# Patient Record
Sex: MALE | URBAN - METROPOLITAN AREA
[De-identification: names, ages, dates, MRNs, and addresses within clinical notes are randomized per-mention and may not be internally consistent; named-entity substitution may affect disease eponyms.]

---

## 2023-06-29 ENCOUNTER — INPATIENT (INPATIENT)
Facility: HOSPITAL | Age: 28
LOS: 13 days | Discharge: HOME | End: 2023-07-13
Attending: PSYCHIATRY & NEUROLOGY | Admitting: PSYCHIATRY & NEUROLOGY
Payer: SELF-PAY

## 2023-06-29 VITALS
DIASTOLIC BLOOD PRESSURE: 82 MMHG | TEMPERATURE: 97 F | RESPIRATION RATE: 16 BRPM | SYSTOLIC BLOOD PRESSURE: 106 MMHG | OXYGEN SATURATION: 99 %

## 2023-06-29 DIAGNOSIS — R46.89 OTHER SYMPTOMS AND SIGNS INVOLVING APPEARANCE AND BEHAVIOR: ICD-10-CM

## 2023-06-29 DIAGNOSIS — F29 UNSPECIFIED PSYCHOSIS NOT DUE TO A SUBSTANCE OR KNOWN PHYSIOLOGICAL CONDITION: ICD-10-CM

## 2023-06-29 LAB
ANION GAP SERPL CALC-SCNC: 9 MMOL/L — SIGNIFICANT CHANGE UP (ref 7–14)
BASOPHILS # BLD AUTO: 0.04 K/UL — SIGNIFICANT CHANGE UP (ref 0–0.2)
BASOPHILS NFR BLD AUTO: 0.8 % — SIGNIFICANT CHANGE UP (ref 0–2)
BUN SERPL-MCNC: 11 MG/DL — SIGNIFICANT CHANGE UP (ref 7–23)
CALCIUM SERPL-MCNC: 9.3 MG/DL — SIGNIFICANT CHANGE UP (ref 8.4–10.5)
CHLORIDE SERPL-SCNC: 104 MMOL/L — SIGNIFICANT CHANGE UP (ref 98–107)
CO2 SERPL-SCNC: 24 MMOL/L — SIGNIFICANT CHANGE UP (ref 22–31)
CREAT SERPL-MCNC: 0.95 MG/DL — SIGNIFICANT CHANGE UP (ref 0.5–1.3)
EGFR: 110 ML/MIN/1.73M2 — SIGNIFICANT CHANGE UP
EOSINOPHIL # BLD AUTO: 0.35 K/UL — SIGNIFICANT CHANGE UP (ref 0–0.5)
EOSINOPHIL NFR BLD AUTO: 7.4 % — HIGH (ref 0–6)
GLUCOSE SERPL-MCNC: 84 MG/DL — SIGNIFICANT CHANGE UP (ref 70–99)
HCT VFR BLD CALC: 37.2 % — LOW (ref 39–50)
HGB BLD-MCNC: 12 G/DL — LOW (ref 13–17)
IANC: 1.96 K/UL — SIGNIFICANT CHANGE UP (ref 1.8–7.4)
IMM GRANULOCYTES NFR BLD AUTO: 0.2 % — SIGNIFICANT CHANGE UP (ref 0–0.9)
LITHIUM SERPL-MCNC: <0.1 MMOL/L — LOW (ref 0.6–1.2)
LYMPHOCYTES # BLD AUTO: 2.09 K/UL — SIGNIFICANT CHANGE UP (ref 1–3.3)
LYMPHOCYTES # BLD AUTO: 44.4 % — HIGH (ref 13–44)
MCHC RBC-ENTMCNC: 29.3 PG — SIGNIFICANT CHANGE UP (ref 27–34)
MCHC RBC-ENTMCNC: 32.3 GM/DL — SIGNIFICANT CHANGE UP (ref 32–36)
MCV RBC AUTO: 90.7 FL — SIGNIFICANT CHANGE UP (ref 80–100)
MONOCYTES # BLD AUTO: 0.26 K/UL — SIGNIFICANT CHANGE UP (ref 0–0.9)
MONOCYTES NFR BLD AUTO: 5.5 % — SIGNIFICANT CHANGE UP (ref 2–14)
NEUTROPHILS # BLD AUTO: 1.96 K/UL — SIGNIFICANT CHANGE UP (ref 1.8–7.4)
NEUTROPHILS NFR BLD AUTO: 41.7 % — LOW (ref 43–77)
NRBC # BLD: 0 /100 WBCS — SIGNIFICANT CHANGE UP (ref 0–0)
NRBC # FLD: 0 K/UL — SIGNIFICANT CHANGE UP (ref 0–0)
PLATELET # BLD AUTO: 239 K/UL — SIGNIFICANT CHANGE UP (ref 150–400)
POTASSIUM SERPL-MCNC: 3.7 MMOL/L — SIGNIFICANT CHANGE UP (ref 3.5–5.3)
POTASSIUM SERPL-SCNC: 3.7 MMOL/L — SIGNIFICANT CHANGE UP (ref 3.5–5.3)
RBC # BLD: 4.1 M/UL — LOW (ref 4.2–5.8)
RBC # FLD: 14.5 % — SIGNIFICANT CHANGE UP (ref 10.3–14.5)
SARS-COV-2 RNA SPEC QL NAA+PROBE: SIGNIFICANT CHANGE UP
SODIUM SERPL-SCNC: 137 MMOL/L — SIGNIFICANT CHANGE UP (ref 135–145)
TSH SERPL-MCNC: 0.89 UIU/ML — SIGNIFICANT CHANGE UP (ref 0.27–4.2)
VALPROATE SERPL-MCNC: <3.15 UG/ML — LOW (ref 50–100)
WBC # BLD: 4.71 K/UL — SIGNIFICANT CHANGE UP (ref 3.8–10.5)
WBC # FLD AUTO: 4.71 K/UL — SIGNIFICANT CHANGE UP (ref 3.8–10.5)

## 2023-06-29 PROCEDURE — G1004: CPT

## 2023-06-29 PROCEDURE — 71045 X-RAY EXAM CHEST 1 VIEW: CPT | Mod: 26

## 2023-06-29 PROCEDURE — 99285 EMERGENCY DEPT VISIT HI MDM: CPT

## 2023-06-29 PROCEDURE — 70450 CT HEAD/BRAIN W/O DYE: CPT | Mod: 26,MG

## 2023-06-29 RX ORDER — DIPHENHYDRAMINE HCL 50 MG
50 CAPSULE ORAL ONCE
Refills: 0 | Status: DISCONTINUED | OUTPATIENT
Start: 2023-06-30 | End: 2023-07-13

## 2023-06-29 RX ORDER — HYDROXYZINE HCL 10 MG
50 TABLET ORAL EVERY 6 HOURS
Refills: 0 | Status: DISCONTINUED | OUTPATIENT
Start: 2023-06-30 | End: 2023-07-13

## 2023-06-29 RX ORDER — RISPERIDONE 4 MG/1
1 TABLET ORAL AT BEDTIME
Refills: 0 | Status: DISCONTINUED | OUTPATIENT
Start: 2023-06-30 | End: 2023-07-02

## 2023-06-29 RX ORDER — SODIUM CHLORIDE 9 MG/ML
3 INJECTION INTRAMUSCULAR; INTRAVENOUS; SUBCUTANEOUS ONCE
Refills: 0 | Status: COMPLETED | OUTPATIENT
Start: 2023-06-29 | End: 2023-06-29

## 2023-06-29 RX ORDER — HALOPERIDOL DECANOATE 100 MG/ML
5 INJECTION INTRAMUSCULAR ONCE
Refills: 0 | Status: COMPLETED | OUTPATIENT
Start: 2023-06-29 | End: 2023-06-29

## 2023-06-29 RX ORDER — DIPHENHYDRAMINE HCL 50 MG
50 CAPSULE ORAL ONCE
Refills: 0 | Status: COMPLETED | OUTPATIENT
Start: 2023-06-29 | End: 2023-06-29

## 2023-06-29 RX ORDER — HALOPERIDOL DECANOATE 100 MG/ML
5 INJECTION INTRAMUSCULAR ONCE
Refills: 0 | Status: DISCONTINUED | OUTPATIENT
Start: 2023-06-30 | End: 2023-07-13

## 2023-06-29 RX ORDER — HALOPERIDOL DECANOATE 100 MG/ML
5 INJECTION INTRAMUSCULAR EVERY 6 HOURS
Refills: 0 | Status: DISCONTINUED | OUTPATIENT
Start: 2023-06-30 | End: 2023-07-13

## 2023-06-29 RX ADMIN — Medication 50 MILLIGRAM(S): at 11:05

## 2023-06-29 RX ADMIN — HALOPERIDOL DECANOATE 5 MILLIGRAM(S): 100 INJECTION INTRAMUSCULAR at 11:05

## 2023-06-29 RX ADMIN — Medication 2 MILLIGRAM(S): at 11:05

## 2023-06-29 NOTE — ED PROVIDER NOTE - PROGRESS NOTE DETAILS
Consulted by Psych. Labs ordered. Patient stable on stretcher. EKG currently being performed. Psych Dr. Quiroz recommending admission to [facility]

## 2023-06-29 NOTE — ED BEHAVIORAL HEALTH ASSESSMENT NOTE - GENERAL APPEARANCE
does not appear to be actively internally stimulated, young gaunt looking malodorous male - in appropriate dirty casual clothing

## 2023-06-29 NOTE — ED BEHAVIORAL HEALTH ASSESSMENT NOTE - LEGAL HISTORY
no pending legal issues listed under the name of "Palmer Wilfredo" (presuming he is this individual).. otherwise, unknown

## 2023-06-29 NOTE — ED BEHAVIORAL HEALTH NOTE - BEHAVIORAL HEALTH NOTE
COVID Exposure Screen- Patient    Have you been tested for COVID-19 in the last 90 days?  (  ) Yes  (  ) No   ( X ) Unknown- Reason: _____  IF YES: Date of test(s), type of test(s), result(s) for ALL tests in last 90 days: ________    In the past 10 days, have you been around anyone with a positive COVID-19 test? (  ) Yes  (  ) No   (  ) Unknown- Reason: ____  IF YES: Were you closer than 6 feet of them for a total of 15 minutes or more in a 24 hour period? (  ) Yes   (  ) No   ( X ) Unknown- Reason: _____         COVID Exposure Screen- collateral (i.e. third-party, chart review, belongings, etc; include EMS and ED staff)    Has the patient been tested for COVID-19 in the last 90 days?  (  ) Yes   (  ) No   ( X ) Unknown- Reason: _____  IF YES: Date of test(s), type of test(s), result(s) for ALL tests in last 90 days: ________    In the past 10 days, has the patient been around anyone with a positive COVID-19 test? (  ) Yes   (  ) No   (  ) Unknown- Reason: ____  IF YES: Was the patient closer than 6 feet of them for a total of 15 minutes or more in a 24 hour period? (  ) Yes   (  ) No   ( X ) Unknown- Reason: _____

## 2023-06-29 NOTE — ED BEHAVIORAL HEALTH ASSESSMENT NOTE - DETAILS
uncooperative.. refuse to fully engage towards meaningful conversation unknown hx of SA or any SIB discussed with  ED team. no collaterals/ referent obtained at this time discussed with  ED team

## 2023-06-29 NOTE — ED ADULT NURSE NOTE - NSFALLUNIVINTERV_ED_ALL_ED
Bed/Stretcher in lowest position, wheels locked, appropriate side rails in place/Call bell, personal items and telephone in reach/Instruct patient to call for assistance before getting out of bed/chair/stretcher/Non-slip footwear applied when patient is off stretcher/[place] to call system/Physically safe environment - no spills, clutter or unnecessary equipment/Purposeful proactive rounding/Room/bathroom lighting operational, light cord in reach

## 2023-06-29 NOTE — ED BEHAVIORAL HEALTH ASSESSMENT NOTE - DESCRIPTION
uncooperative, refusing to engage with  ED staff.. no reported escalation towards violence whilst at the  ED. does not appear to be inebriated. not delirious. denied any ongoing somatic complaints. no active SI or HI     Vital Signs Last 24 Hrs  T(C): 36.2 (29 Jun 2023 10:27), Max: 36.2 (29 Jun 2023 10:27)  T(F): 97.1 (29 Jun 2023 10:27), Max: 97.1 (29 Jun 2023 10:27)  HR: --  BP: 106/82 (29 Jun 2023 10:27) (106/82 - 106/82)  BP(mean): --  RR: 16 (29 Jun 2023 10:27) (16 - 16)  SpO2: 99% (29 Jun 2023 10:27) (99% - 99%)    Parameters below as of 29 Jun 2023 10:27  Patient On (Oxygen Delivery Method): room air uncooperative, refusing to engage with  ED staff.. no reported escalation towards violence whilst at the  ED. does not appear to be inebriated. not delirious. denied any ongoing somatic complaints. no active SI or HI.. not acutely manic.     Vital Signs Last 24 Hrs  T(C): 36.2 (29 Jun 2023 10:27), Max: 36.2 (29 Jun 2023 10:27)  T(F): 97.1 (29 Jun 2023 10:27), Max: 97.1 (29 Jun 2023 10:27)  HR: --  BP: 106/82 (29 Jun 2023 10:27) (106/82 - 106/82)  BP(mean): --  RR: 16 (29 Jun 2023 10:27) (16 - 16)  SpO2: 99% (29 Jun 2023 10:27) (99% - 99%)    Parameters below as of 29 Jun 2023 10:27  Patient On (Oxygen Delivery Method): room air unknown unknown marital status/ domicility/ employment uncooperative, refusing to engage with  ED staff.. no reported escalation towards violence whilst at the  ED. does not appear to be inebriated. not delirious. denied any ongoing somatic complaints. no active SI or HI.. not acutely manic.     Vital Signs Last 24 Hrs  T(C): 36.2 (29 Jun 2023 10:27), Max: 36.2 (29 Jun 2023 10:27)  T(F): 97.1 (29 Jun 2023 10:27), Max: 97.1 (29 Jun 2023 10:27)  HR: --  BP: 106/82 (29 Jun 2023 10:27) (106/82 - 106/82)  BP(mean): --  RR: 16 (29 Jun 2023 10:27) (16 - 16)  SpO2: 99% (29 Jun 2023 10:27) (99% - 99%)    Parameters below as of 29 Jun 2023 10:27  Patient On (Oxygen Delivery Method): room air    at 2:08PM: attempted to re-evaluate Pt. he is currently sedated.  at 2:10PM, restraints were taken off. no occurrence of agitation reported uncooperative, refusing to engage with  ED staff.. no reported escalation towards violence whilst at the  ED. does not appear to be inebriated. not delirious. denied any ongoing somatic complaints. no active SI or HI.. not acutely manic.     Vital Signs Last 24 Hrs  T(C): 36.2 (29 Jun 2023 10:27), Max: 36.2 (29 Jun 2023 10:27)  T(F): 97.1 (29 Jun 2023 10:27), Max: 97.1 (29 Jun 2023 10:27)  HR: --  BP: 106/82 (29 Jun 2023 10:27) (106/82 - 106/82)  BP(mean): --  RR: 16 (29 Jun 2023 10:27) (16 - 16)  SpO2: 99% (29 Jun 2023 10:27) (99% - 99%)    Parameters below as of 29 Jun 2023 10:27  Patient On (Oxygen Delivery Method): room air    at 2:08PM: attempted to re-evaluate Pt. he is currently sedated.  at 2:10PM, restraints were taken off. no occurrence of agitation reported    at 4:30PM: attempted to engage Pt. he is easily arousable but continues to be guarded, suspicious, uncooperative. he refuses to engage in a meaningful conversation.   - case discussed with  ED team. all labs reviewed along with imaging. medical providers medically clearing Pt.

## 2023-06-29 NOTE — ED BEHAVIORAL HEALTH ASSESSMENT NOTE - HPI (INCLUDE ILLNESS QUALITY, SEVERITY, DURATION, TIMING, CONTEXT, MODIFYING FACTORS, ASSOCIATED SIGNS AND SYMPTOMS)
currently given ID as CALVIN NICHOLAS  on initial encounter with ED attending, he provided a name: "Palmer Villalobos, : 1998"  there is NO yield on CVM or PSYCKES with the above name provided  nothing on the [facility]   Reference #: [id number]  as well as [facility] [address]/ [employer] site  nothing on the Department of Corrections and Community Supervision Incarcerated Lookup (https://nysdoccslookup.[facility].[place].gov/)    young male, unknown marital status, unknown domicility and unknown employment status. unknown psych hx - no yield per hospital data base; unknown medical hx; unknown illicit substance use. this morning presented to the ED BIB EMS + [employer] (in hand cuffs but NOT UNDER ARREST) as concerned motorist(s) activated 911 as Pt reportedly had been wandering on a highway.     per EMS, on scene, the Pt was uncooperative. he refused to provide a name and .  Pt overall refused to talk. there was NO reported aggression or violence.  EMS reported that the Pt had been walking along the intersection of the [address] Pt reportedly attempted to "walk away" when police arrived on scene.      initial encounter made by ED attending at the triage: reported that Pt did not have eye contact; initially not answering questions.. then provided attending that his name was: "Palmer Villalobos with : 1998". not on any meds. claimed that he was "trying to leave [facility]" but did not elaborate on this further.  did not possess any Identification documents on him. noted to be irritable, dismissive but not observed to be severely agitated nor reported escalation towards violence/ physicality.  was then adviced by ED attending that he was going to be subjected to standard ED protocol of ensuring medical evaluation; i.e. blood works, urinalysis, etc. "I don't care", he told ED attending.      He remained "defiant", dismissive, oppositional towards ED staff - again, no reported aggressive or violent behavior observed by  ED staff.  though, noted by writer to be guarded, scanning the environment but did not appear to be actively internally stimulated.  there was also no active verbalization of any SI or HI during my encounter with him.  apart from the aforementioned presentation, he is also noted to be underlyingly hostile, oppositional. he was offered PO PRN meds for which, there was no acknowledgment elicited whether he wanted to take or not.  he is malodorous, disheveled and gaunt. Pt refused to change into hospital gown.. given ongoing presentation with potential unpredictability, he was eventually medicated with Haldol 5mg IM + ativan 2mg IM + benadryl 50mg IM.  subsequently placed in 4 pt restraints. Pt refused the hospital gown and was incompletely dressed (in hospital gown)/ was half naked waist up.. transferred to the main  ED hallway. writer attempted to cover him with a blanket (from waist up). "don't cover me. take the blanket off", he told writer.      multiple attempts were made to engage Pt towards a meaningful conversation proved futile. currently given ID as CALVIN NICHOLAS  MRN: [id number]   on initial encounter with ED attending, he provided a name: "Palmer Villalobos, : 1998"  there is NO yield on CVM or PSYCKES with the above name provided  nothing on the [facility]   Reference #: [id number]  as well as [facility] [address]/ [employer] site  nothing on the Department of Corrections and Community Supervision Incarcerated Lookup (https://nysdoccslookup.[facility].[place].gov/)    young male, unknown marital status, unknown domicility and unknown employment status. unknown psych hx - no yield per hospital data base; unknown medical hx; unknown illicit substance use. this morning presented to the ED BIB EMS + [employer] (in hand cuffs but NOT UNDER ARREST) as concerned motorist(s) activated 911 as Pt reportedly had been wandering on a highway.     per EMS, on scene, the Pt was uncooperative. he refused to provide a name and .  Pt overall refused to talk. there was NO reported aggression or violence.  EMS reported that the Pt had been walking along the intersection of the [address] Pt reportedly attempted to "walk away" when police arrived on scene.      initial encounter made by ED attending at the triage: reported that Pt did not have eye contact; initially not answering questions.. then provided attending that his name was: "Palmer Villalobos with : 1998". not on any meds. claimed that he was "trying to leave [facility]" but did not elaborate on this further.  did not possess any Identification documents on him. noted to be irritable, dismissive but not observed to be severely agitated nor reported escalation towards violence/ physicality.  was then adviced by ED attending that he was going to be subjected to standard ED protocol of ensuring medical evaluation; i.e. blood works, urinalysis, etc. "I don't care", he told ED attending.      He remained "defiant", dismissive, oppositional towards ED staff - again, no reported aggressive or violent behavior observed by  ED staff.  though, noted by writer to be guarded, scanning the environment but did not appear to be actively internally stimulated.  there was also no active verbalization of any SI or HI during my encounter with him.  apart from the aforementioned presentation, he is also noted to be underlyingly hostile, oppositional. he was offered PO PRN meds for which, there was no acknowledgment elicited whether he wanted to take or not.  he is malodorous, disheveled and gaunt. Pt refused to change into hospital gown.. given ongoing presentation with potential unpredictability, he was eventually medicated with Haldol 5mg IM + ativan 2mg IM + benadryl 50mg IM.  subsequently placed in 4 pt restraints. Pt refused the hospital gown and was incompletely dressed (in hospital gown)/ was half naked waist up.. transferred to the main  ED hallway. writer attempted to cover him with a blanket (from waist up). "don't cover me. take the blanket off", he told writer.      multiple attempts were made to engage Pt towards a meaningful conversation proved futile. currently given ID as CALVIN NICHOLAS  MRN: [id number]   on initial encounter with ED attending, he provided a name: "Palmer Villalobos, : 1998"  there is NO yield on CVM or PSYCKES with the above name provided  nothing on the [facility]   Reference #: [id number]  as well as [facility] [address]/ [employer] site  nothing on the Department of Corrections and Community Supervision Incarcerated Lookup (https://nysdoccslookup.[facility].[place].gov/)    young male, unknown marital status, unknown domicility and unknown employment status. unknown psych hx - no yield per hospital data base; unknown medical hx; unknown illicit substance use. this morning presented to the ED BIB EMS + [employer] (in hand cuffs but NOT UNDER ARREST) after concerned motorist(s) activated 911 as Pt reportedly had been wandering on a highway.     per EMS, on scene, the Pt was uncooperative. he refused to provide a name and .  Pt overall refused to talk. there was NO reported aggression or violence.  EMS reported that the Pt had been walking along the [address] of [address] Pt reportedly attempted to "walk away" when police arrived on scene.      initial encounter made by ED attending at the triage: reported that Pt did not have eye contact; initially not answering questions.. then provided attending that his name was: "Palmer Villalobos with : 1998". not on any meds. claimed that he was "trying to leave [facility]" but did not elaborate on this further.  did not possess any Identification documents on him. noted to be irritable, dismissive but not observed to be severely agitated nor reported escalation towards violence/ physicality.  was then adviced by ED attending that he was going to be subjected to standard ED protocol of ensuring medical evaluation; i.e. blood works, urinalysis, etc. "I don't care", he told ED attending.      He remained "defiant", dismissive, oppositional towards ED staff - again, no reported aggressive or violent behavior observed by  ED staff.  though, noted by writer to be guarded, scanning the environment but did not appear to be actively internally stimulated.  there was also no active verbalization of any SI or HI during my encounter with him.  apart from the aforementioned presentation, he is also noted to be underlyingly hostile, oppositional. he was offered PO PRN meds for which, there was no acknowledgment elicited whether he wanted to take or not.  he is malodorous, disheveled and gaunt. Pt refused to change into hospital gown.. given ongoing presentation with potential unpredictability, he was eventually medicated with Haldol 5mg IM + ativan 2mg IM + benadryl 50mg IM.  subsequently placed in 4 pt restraints. Pt refused the hospital gown and was incompletely dressed (in hospital gown)/ was half naked waist up.. transferred to the main  ED hallway. writer attempted to cover him with a blanket (from waist up). "don't cover me. take the blanket off", he told writer.      multiple attempts were made to engage Pt towards a meaningful conversation proved futile.

## 2023-06-29 NOTE — ED PROVIDER NOTE - CLINICAL SUMMARY MEDICAL DECISION MAKING FREE TEXT BOX
This is a young male who the police states they were getting because that he was on the side of the highway.  They state they went to check on him and he began to walk away from them and then walk into traffic.  Patient was calm however uncooperative.  He was brought into the emergency department for evaluation at first was not answering then began to make eye contact.  Patient stated he was fine.  Patient then revealed his name to be Palmer Villalobos date of birth August 2, 1998.  Patient not found in the system.  Tried to explain that we need to make sure he is okay and get the story out of him however he continued to be uncooperative.  He does state that he was trying to leave the state would not elaborate any further states he does not have an ID.  He denies hearing or seeing things states he does not take medication but does not divulge any other information.  It was explained that we need to medicate patient to safely worn out any life-threatening emergencies patient states that it's whatever and he doesn't care then asks I do not talk to him again.    psych consulted will ruke out all causes of AMS     General: Well appearing, well nourished, in no distress  Head: Normocephalic, atraumatic  Eyes: Conjunctiva clear, sclera non-icteric  Mouth: Mucous membranes moist  Neck: Supple  Cardiac: RRR  Lungs: equal chest rise   Abdomen: Bowel sounds present, soft, no tenderness, non distended, no organomegaly, masses  Extremities: No amputations or deformities, cyanosis, edema  Musculoskeletal: No crepitation, defects or decreased range of motion, strength intact throughout, pulses intact  Neurologic: No gross deficits  Psychiatric: ? looking at things that aren't there avoidant   Skin: Warm,dry. Good turgor, no rash, unusual bruising, Cap refill <2 seconds This is a young male who the police states they were getting called because he was on the side of the highway.  They state they went to check on him and he began to walk away from them and then walk into traffic.  Patient was calm however uncooperative.  He was brought into the emergency department for evaluation at first was not answering then began to make eye contact.  Patient stated he was fine.  Patient then revealed his name to be Palmer Riverasp date of birth August 2, 1998.  Patient not found in the system.  I tried to explain that we need to make sure he is okay and get the story out of him however he continued to be uncooperative.  He does state that he was trying to leave the state would not elaborate any further states he does not have an ID, currently does not have a shirt on either.  He denies hearing or seeing things states he does not take medication but does not divulge any other information.  It was explained that we need to medicate patient to safely rule out any life-threatening emergencies patient states that it's whatever and he doesn't care then asks I do not talk to him again.    psych consulted will rulu out all causes of AMS including infection, intracranial pathology, electrolyte , thyroid abnormalities     General: Well appearing, well nourished, in no distress  Head: Normocephalic, atraumatic  Eyes: Conjunctiva clear, sclera non-icteric  Mouth: Mucous membranes moist  Neck: Supple  Cardiac: RRR  Lungs: equal chest rise   Abdomen: Bowel sounds present, soft, no tenderness, non distended, no organomegaly, masses  Extremities: No amputations or deformities, cyanosis, edema  Musculoskeletal: No crepitation, defects or decreased range of motion, pulses intact  Neurologic: No gross deficits  Psychiatric: ? looking at things that aren't there avoidant   Skin: Warm,dry. Good turgor, no rash, unusual bruising, Cap refill <2 seconds

## 2023-06-29 NOTE — ED BEHAVIORAL HEALTH NOTE - BEHAVIORAL HEALTH NOTE
Writer contacted [facility] central intake and spoke janeth/ Isabela ([phone]). PAtient assigned to unit low 6.

## 2023-06-29 NOTE — ED BEHAVIORAL HEALTH ASSESSMENT NOTE - SUBSTANCE ISSUES AND PLAN (INCLUDE STANDING AND PRN MEDICATION)
no indication for standing CIWA/CINA.. he is not exhibiting hallmarks for impending withdrawal symptoms no indication for standing CIWA/CINA.. he is not exhibiting hallmarks of impending withdrawal symptoms

## 2023-06-29 NOTE — ED BEHAVIORAL HEALTH ASSESSMENT NOTE - NS ED BHA PLAN REASON FOR IP CARE DANGER SELF SUICIDAL BEHAVIOR2 FT
found wandering around a highway.. apparently reported to have attempted to walk away from police and going into traffic

## 2023-06-29 NOTE — ED BEHAVIORAL HEALTH ASSESSMENT NOTE - LEVEL OF CONSCIOUSNESS
Alert noted alert at the  ED triage, then after being medicated: arousable with verbal stimulus/Alert

## 2023-06-29 NOTE — ED BEHAVIORAL HEALTH ASSESSMENT NOTE - OTHER
see below unknown unknown past or recent psych hx nor any hx of illicit substance use including alcohol unknown hx

## 2023-06-29 NOTE — BH PATIENT PROFILE - FALL HARM RISK - UNIVERSAL INTERVENTIONS
Bed in lowest position, wheels locked, appropriate side rails in place/Call bell, personal items and telephone in reach/Instruct patient to call for assistance before getting out of bed or chair/Non-slip footwear when patient is out of bed/[place] to call system/Physically safe environment - no spills, clutter or unnecessary equipment/Purposeful Proactive Rounding/Room/bathroom lighting operational, light cord in reach

## 2023-06-29 NOTE — ED ADULT NURSE REASSESSMENT NOTE - NS ED NURSE REASSESS COMMENT FT1
Break coverage RN: Pt sleeping at this time. Respirations even and unlabored. No acute distress noted. Pending inpatient bed assignment when available. Safety maintained.

## 2023-06-29 NOTE — ED ADULT TRIAGE NOTE - CHIEF COMPLAINT QUOTE
Pt brought in by EMS after being found on side of highway. Unable to obtain pt history as pt is refusing to answer questions, however calm and cooperative. Arrives in handcuffs with [employer]. MD Choi assessing pt in triage.

## 2023-06-29 NOTE — ED BEHAVIORAL HEALTH ASSESSMENT NOTE - SUMMARY
young male with unknown past psych hx, unknown medical hx who now presents to the ED BIB EMS + [employer] as he was found wandering around a highway.. on initial encounter with first responders, the Pt reportedly attempted to walk away.  on scene, he was uncooperative, refusing to provide demographical details.     at the  ED, he was overall noted to be uncooperative, guarded, suspicious, remained unpredictable/ underling hostility - but no reported or observed escalation towards violent/ aggressive behavior.  Initially, Pt unable to partake towards a meaningful psychiatric evaluation.  whether current behavior - with high level of potential lethality as he was noted "wandering around a highway" - is psychotically vs affectively vs illicit substance use vs organically driven, cannot be fully ascertained at this time.  Pt continues to be guarded, uncooperative.  doubt that the last 2 differentials (illicit substance influence and delirium) introduced here, may help explain Pt's current presentation.  he does not appear to be acutely intoxicated (at least with alcohol) nor does he appear to be delirious.   Pt will need to be revisited once more engageable    RECOMMENDATIONS:  1. DEFER STANDING PSYCHOTROPIC FOR NOW  2. PRN: haldol 5mg PO/IM + ativan 2mg PO/IM q6Hrs for agitation   - defer antipsychotic if qTC > 500m/s  3. pertinent labs including neuro-imaging (though, no focal neuro deficit(s) noted during this encounter)  4. continue to monitor VS   - discussed with  ED staff young male with unknown past psych hx, unknown medical hx who now presents to the ED BIB EMS + [employer] as he was found wandering around a highway.. on initial encounter with first responders, the Pt reportedly attempted to walk away.  on scene, he was uncooperative, refusing to provide demographical details.     overall during Pt initial presentation at the  ED, he was noted to be uncooperative, guarded, suspicious, remained unpredictable/ underling hostility - but no reported or observed escalation towards violent/ aggressive behavior.  Initially, Pt unable to partake towards a meaningful psychiatric evaluation.  whether current behavior - with high level of potential lethality as he was noted "wandering around a highway" - is psychotically vs affectively vs illicit substance use vs organically driven, cannot be fully ascertained at this time.  Pt continues to be guarded, uncooperative.  doubt that the last 2 differentials (illicit substance influence and delirium) introduced here, may help explain Pt's current presentation.  he does not appear to be acutely intoxicated (at least with alcohol) nor does he appear to be delirious.   Pt will need to be revisited once more engageable    RECOMMENDATIONS:  1. DEFER STANDING PSYCHOTROPIC FOR NOW  2. PRN: haldol 5mg PO/IM + ativan 2mg PO/IM q6Hrs for agitation   - defer antipsychotic if qTC > 500m/s  3. pertinent labs including neuro-imaging (though, no focal neuro deficit(s) noted during this encounter)  4. continue to monitor VS   - discussed with  ED staff young male with unknown past psych hx, unknown medical hx who now presents to the ED BIB EMS + [employer] as he was found wandering around a highway.. on initial encounter with first responders, the Pt reportedly attempted to walk away.  on scene, he was uncooperative, refusing to provide demographical details.     overall during Pt's initial presentation (at the  ED), he was noted to be uncooperative, guarded, suspicious, remained unpredictable/ underling hostility - but no reported or observed escalation towards violent/ aggressive behavior.  Initially, Pt is unable to partake towards a meaningful psychiatric evaluation.  whether Pt's current behavior - with high level of potential lethality (to self) as he was noted "wandering around a highway" - is psychotically vs affectively vs illicit substance use vs organically driven, cannot be fully ascertained at this time.  Pt continues to be guarded, uncooperative.  doubt that the last 2 differentials (illicit substance influence and delirium) discussed here, is of significant consideration and thus, help explain Pt's current presentation.      at this time, he does not appear to be acutely intoxicated (at least, with alcohol) nor does he appear to be delirious.   Pt will need to be revisited once more engageable    RECOMMENDATIONS:  1. DEFER STANDING PSYCHOTROPIC FOR NOW  2. PRN: haldol 5mg PO/IM + ativan 2mg PO/IM q6Hrs for agitation   - defer antipsychotic if qTC > 500m/s  3. pertinent labs including neuro-imaging (though, no focal neuro deficit(s) noted during this encounter)  4. continue to monitor VS   - discussed with  ED staff  5. collateral if any once, his true identity is known young male with unknown past psych hx, unknown medical hx who now presents to the ED BIB EMS + [employer] as he was found wandering around a highway.. on initial encounter with first responders, the Pt reportedly attempted to walk away.  on scene, he was uncooperative, refusing to provide demographic data     overall during Pt's initial presentation (at the  ED), he was noted to be uncooperative, guarded, suspicious, remained unpredictable/ underling hostility - but no reported or observed escalation towards violent/ aggressive behavior.  Initially, Pt is unable to partake towards a meaningful psychiatric evaluation.  whether Pt's current behavior - with high level of potential lethality (to self) as he was noted "wandering around a highway" - is psychotically vs affectively vs illicit substance use vs organically driven, cannot be fully ascertained at this time.  Pt continues to be guarded, uncooperative.  doubt that the last 2 differentials (illicit substance influence and delirium) discussed here, is of significant consideration and thus, help explain Pt's current presentation.      at this time, he does not appear to be acutely intoxicated (at least, with alcohol) nor does he appear to be delirious.   Pt will need to be revisited once more engageable    RECOMMENDATIONS:  1. DEFER STANDING PSYCHOTROPIC FOR NOW  2. PRN: haldol 5mg PO/IM + ativan 2mg PO/IM q6Hrs for agitation   - defer antipsychotic if qTC > 500m/s  3. pertinent labs including neuro-imaging (though, no focal neuro deficit(s) noted during this encounter)  4. continue to monitor VS   - discussed with  ED staff  5. collateral if any once, his true identity is known young male with unknown past psych hx, unknown medical hx who now presents to the ED BIB EMS + [employer] as he was found wandering around a highway.. on initial encounter with first responders, the Pt reportedly attempted to walk away.  on scene, he was uncooperative, refusing to provide demographic data     overall during Pt's initial presentation (at the  ED), he was noted to be uncooperative, guarded, suspicious, remained unpredictable/ underling hostility - but no reported or observed escalation towards violent/ aggressive behavior.  Initially, Pt is unable to partake towards a meaningful psychiatric evaluation.  whether Pt's current behavior - with high level of potential lethality (to self) as he was noted "wandering around a highway" - is psychotically vs affectively vs illicit substance use vs organically driven, cannot be fully ascertained at this time.  Pt continues to be guarded, uncooperative.  doubt that the last 2 differentials (illicit substance influence and delirium) discussed here, is of significant consideration and thus, help explain Pt's current presentation.      at this time, he does not appear to be acutely intoxicated (at least, with alcohol) nor does he appear to be delirious.   Pt will need to be revisited once more engageable    RECOMMENDATIONS:  1. DEFER STANDING PSYCHOTROPIC FOR NOW  2. PRN: haldol 5mg PO/IM + ativan 2mg PO/IM q6Hrs for agitation   - defer antipsychotic if qTC > 500m/s  3. pertinent labs including neuro-imaging (though, no focal neuro deficit(s) noted during this encounter)  4. continue to monitor VS   - discussed with  ED staff  5. collateral if any once, his true identity is known    AS OF 4:30PM, CONTINUES TO BE UNCOOPERATIVE, GUARDED, SUSPICIOUS.. UNABLE TO ENGAGE TOWARDS SAFETY EVALUATION. IS SEVERELY AFFECTIVELY DYSREGULATED AND CONTINUES TO BE UNPREDICTABLE.. WITH POOR INSIGHT AND IMPAIRED JUDGEMENT. GIVEN RECENT ACTIVITY OF WALKING ALONG A HIGHWAY AND IT'S IMPACT ON POTENTIALLY CAUSING INJURY TO THE PATIENT; I.E. PATIENT AT risk TO SELF + no collateral information + unable to ensure for safety,  ED service to pursue psych admission aimed at stabilization and ensuring safety     RECOMMENDATIONS:   1. CASE HANDED OVER TO Dr CARLOS Godinez,  of [facility]. to start with Risperdal at 1mg HS. titrate  2. PRN: haldol 5mg PO/IM + ativan 2mg PO/IM q6Hrs for agitation   - defer antipsychotic if qTC > 500m/s  3. PRN: atarax 50mg PO q6hrs for anxiety/ sleep disturbances

## 2023-06-29 NOTE — ED BEHAVIORAL HEALTH ASSESSMENT NOTE - DIFFERENTIAL
primary psychosis vs primary affective disorder (+/- psychotic component) vs substance induced mood or psychotic disorder

## 2023-06-29 NOTE — ED BEHAVIORAL HEALTH ASSESSMENT NOTE - RISK ASSESSMENT
11:05AM: UNABLE TO ELICIT FOR LEVEL OF ACUITY RE: SUICIDALITY/ VIOLENCE risks at this time as he IS CURRENTLY UNCOOPERATIVE 11:05AM: UNABLE TO ELICIT FOR LEVEL OF ACUITY RE: SUICIDALITY/ VIOLENCE risks at this time as he IS CURRENTLY UNCOOPERATIVE    4:30PM: At this time given all risks taken into consideration, the Pt is at imminent risk for self harm. unable to currently determine chronicity of suicide risk as he is uncooperative.  Pt's current presentation is deemed not dischargeable back to the community.  Current increased in risk can be mitigated by a psychiatric admission with supervision, initiating psych meds with titration towards efficacious dosing range, therapeutic milieu

## 2023-06-29 NOTE — ED ADULT NURSE NOTE - OBJECTIVE STATEMENT
Pt was found wandering on the highway, bystander called 911, and pt attempted to run into highway traffic on arrival. Pt was uncooperative on arrival refusing to speak to staff. Pt appears pre-occupied, malodorous, disheveled, and is possibly homeless.   Given IM injection for safety.

## 2023-06-30 PROCEDURE — 99222 1ST HOSP IP/OBS MODERATE 55: CPT

## 2023-06-30 RX ORDER — DIPHENHYDRAMINE HCL 50 MG
50 CAPSULE ORAL ONCE
Refills: 0 | Status: COMPLETED | OUTPATIENT
Start: 2023-06-30 | End: 2023-06-30

## 2023-06-30 RX ADMIN — RISPERIDONE 1 MILLIGRAM(S): 4 TABLET ORAL at 21:20

## 2023-06-30 NOTE — BH INPATIENT PSYCHIATRY ASSESSMENT NOTE - NSBHASSESSSUMMFT_PSY_ALL_CORE
young male, unknown marital status, unknown domicility and unknown employment status. unknown psych hx - no yield per hospital data base; unknown medical hx; unknown illicit substance use. this morning presented to the ED BIB EMS + [employer] (in hand cuffs but NOT UNDER ARREST) after concerned motorist(s) activated 911 as Pt reportedly had been wandering on a highway.     >Legal: 9.39 INVOL  >Obs: Routine, no current SI. no need for CO, patient not expected to pose risk to self or others in controlled inpatient setting  >Psychiatric Meds: Risperdal 1mg PO at bedtime (psychosis)  >Labs: Admission labs reviewed, no acute findings.   >Medical:  No acute concerns. No consultations needed at this time.   >Social: milieu/structured therapy  >Treatment Interventions: Groups and Individual Therapy/CBT  >Dispo: pending remission of sx young male, unknown marital status, unknown domicile and unknown employment status. unknown psych hx - no yield per hospital data base; unknown medical hx; unknown illicit substance use. this morning presented to the ED BIB EMS + [employer] (in hand cuffs but NOT UNDER ARREST) after concerned motorist(s) activated 911 as Pt reportedly had been wandering on a highway.     PLAN  >Legal: 9.39 INVOL  >Obs: Routine, no current SI. no need for CO, patient not expected to pose risk to self or others in controlled inpatient setting  >Psychiatric Meds: Risperdal 1mg PO at bedtime (psychosis) and uptitrate to effect;  DE LEON for safety and compliance, eg, S[place]enna or even later Trinza for safety  >Labs: Admission labs reviewed, no acute findings.   >Medical:  No acute concerns. No consultations needed at this time; med f/u for eosinophilia/deranged CBC  >Social: milieu/structured therapy  >Treatment Interventions: Groups and Individual Therapy/CBT  >Dispo: pending remission of sx

## 2023-06-30 NOTE — BH INPATIENT PSYCHIATRY ASSESSMENT NOTE - GENERAL APPEARANCE
does not appear to be actively internally stimulated, young gaunt looking malodorous male - in appropriate dirty casual clothing/No deformities present No deformities present

## 2023-06-30 NOTE — PSYCHIATRIC REHAB INITIAL EVALUATION - NSBHPRRECOMMEND_PSY_ALL_CORE
Patient is a 28 year old, -American male, brought to the hospital by EMS after he was found wandering the highway. Patient was reported to be uncooperative in the ED. Patient has an unknown hx of psychiatric treatment, diagnosis, substance use, and hx of suicide attempts, aggression/violence.  Patient was medicated and is currently asleep and unable to participate in initial assessment process. Patient was reported to be guarded and suspicious.  Patient has been unable to engage in meaningful dialogue at this time.   Patient and writer are unable to establish a collaborative rehabilitation goal, therefore an appropriate goal will be selected for the patient.   Psychiatric rehabilitation staff will continue to provide ongoing support and encouragement.

## 2023-06-30 NOTE — BH INPATIENT PSYCHIATRY ASSESSMENT NOTE - LEVEL OF CONSCIOUSNESS
noted alert at the  ED triage, then after being medicated: arousable with verbal stimulus/Alert Alert

## 2023-06-30 NOTE — BH SOCIAL WORK INITIAL PSYCHOSOCIAL EVALUATION - OTHER PAST PSYCHIATRIC HISTORY (INCLUDE DETAILS REGARDING ONSET, COURSE OF ILLNESS, INPATIENT/OUTPATIENT TREATMENT)
Pt is a 28 year old male with unknown psychiatric illness. Pt was brought in after he was found wondering a highway. Pt was unwilling/ unable to report any prior psychiatric/ medical health information. Pt seems internally occupied has poor insight and impaired judment. Upon arrival Pts vitals sign were with in normal limits. Pt was calm and cooperative ate food and went to sleep. Pt currently sleeping with out and issues, adjusting to unit well as of now. Will continue to monitor and provide support as needed. Pt was admitted asa 27 y/o male with unknown psychiatric illness. Pt was brought in after he was found wondering [place] highway. Pt was unwilling/ unable to report any prior psychiatric/ medical health information. Pt seems internally occupied has poor insight and impaired judment. Upon arrival PTs vitals sign were with in normal limits. Pt was calm and cooperative ate food and went to sleep.once SW and NP met with PT he reported his name was Palmer Villalobos with date of birth as August 2, 1998.

## 2023-06-30 NOTE — BH SOCIAL WORK INITIAL PSYCHOSOCIAL EVALUATION - NSBHCOGDIS_PSY_ALL_CORE
Patient was unwilling or unable to provide answers to most of assessment questions./Unable to answer (specify)

## 2023-06-30 NOTE — BH SOCIAL WORK INITIAL PSYCHOSOCIAL EVALUATION - NSBHCHILDEVENTS_PSY_ALL_CORE
Patient was unwilling or unable to provide answers to most of assessment questions./Out of home placement

## 2023-06-30 NOTE — BH INPATIENT PSYCHIATRY ASSESSMENT NOTE - NSBHMSETHTPROC_PSY_A_CORE
no overt disorganization/Disorganized/Illogical/Impaired reasoning Disorganized/Illogical/Impaired reasoning

## 2023-06-30 NOTE — BH INPATIENT PSYCHIATRY ASSESSMENT NOTE - DETAILS
Pt reports mother has hx of heroin use and father hx of crack cocaine use.  He reports they both have been in psychiatric hospitals but he does not know what they are diagnosed with, possibly psychosis unknown hx of SA or any SIB

## 2023-06-30 NOTE — BH INPATIENT PSYCHIATRY ASSESSMENT NOTE - NSBHCHARTREVIEWVS_PSY_A_CORE FT
Vital Signs Last 24 Hrs  T(C): 36 (06-30-23 @ 07:35), Max: 36 (06-30-23 @ 07:35)  T(F): 96.8 (06-30-23 @ 07:35), Max: 96.8 (06-30-23 @ 07:35)  HR: 52 (06-29-23 @ 19:55) (52 - 52)  BP: 133/85 (06-29-23 @ 19:55) (133/85 - 133/85)  BP(mean): --  RR: 16 (06-29-23 @ 19:55) (16 - 16)  SpO2: 100% (06-30-23 @ 06:15) (100% - 100%)    Orthostatic VS  06-30-23 @ 07:35  Lying BP: --/-- HR: --  Sitting BP: 122/63 HR: 77  Standing BP: --/-- HR: --  Site: --  Mode: --  Orthostatic VS  06-30-23 @ 06:15  Lying BP: --/-- HR: --  Sitting BP: 144/85 HR: 77  Standing BP: 144/85 HR: 64  Site: --  Mode: --   Vital Signs Last 24 Hrs  T(C): 36.9 (07-03-23 @ 08:19), Max: 36.9 (07-03-23 @ 08:19)  T(F): 98.4 (07-03-23 @ 08:19), Max: 98.4 (07-03-23 @ 08:19)  HR: 87 (07-03-23 @ 08:19) (87 - 87)  BP: 121/65 (07-03-23 @ 08:19) (121/65 - 121/65)  BP(mean): --  RR: --  SpO2: --    Orthostatic VS  07-02-23 @ 20:31  Lying BP: --/-- HR: --  Sitting BP: 126/56 HR: 97  Standing BP: 116/57 HR: 80  Site: --  Mode: --   Vital Signs Last 24 Hrs  T(C): 35.7 (07-11-23 @ 08:14), Max: 35.7 (07-11-23 @ 08:14)  T(F): 96.3 (07-11-23 @ 08:14), Max: 96.3 (07-11-23 @ 08:14)  HR: 70 (07-11-23 @ 08:14) (70 - 70)  BP: 120/82 (07-11-23 @ 08:14) (120/82 - 120/82)  BP(mean): --  RR: --  SpO2: --

## 2023-06-30 NOTE — BH INPATIENT PSYCHIATRY ASSESSMENT NOTE - NSBHMSEBEHAV_PSY_A_CORE
suspicious, guarded, scanning intermittently but mostly poor eye contact/Cooperative/Other Cooperative/Other

## 2023-06-30 NOTE — BH INPATIENT PSYCHIATRY ASSESSMENT NOTE - NSBHATTESTAPPBILLTIME_PSY_A_CORE
I attest my time as VILMA is greater than 50% of the total combined time spent on qualifying patient care activities. I have reviewed and verified the documentation.

## 2023-06-30 NOTE — BH INPATIENT PSYCHIATRY ASSESSMENT NOTE - HPI (INCLUDE ILLNESS QUALITY, SEVERITY, DURATION, TIMING, CONTEXT, MODIFYING FACTORS, ASSOCIATED SIGNS AND SYMPTOMS)
Per [facility] ED assessment, "currently given ID as CALVIN NICHOLAS  MRN: [id number]   on initial encounter with ED attending, he provided a name: "Palmer Villalobos, : 1998"  there is NO yield on CVM or PSYCKES with the above name provided  nothing on the [facility]   Reference #: [id number]  as well as [facility] [address]/ [employer] site  nothing on the Department of Corrections and Community Supervision Incarcerated Lookup (https://nysdoccslookup.[facility].[place].gov/)    young male, unknown marital status, unknown domicility and unknown employment status. unknown psych hx - no yield per hospital data base; unknown medical hx; unknown illicit substance use. this morning presented to the ED BIB EMS + [employer] (in hand cuffs but NOT UNDER ARREST) after concerned motorist(s) activated 911 as Pt reportedly had been wandering on a highway.     per EMS, on scene, the Pt was uncooperative. he refused to provide a name and .  Pt overall refused to talk. there was NO reported aggression or violence.  EMS reported that the Pt had been walking along the intersection of [address] Pt reportedly attempted to "walk away" when police arrived on scene.      initial encounter made by ED attending at the triage: reported that Pt did not have eye contact; initially not answering questions.. then provided attending that his name was: "Palmer Villalobos with : 1998". not on any meds. claimed that he was "trying to leave [facility]" but did not elaborate on this further.  did not possess any Identification documents on him. noted to be irritable, dismissive but not observed to be severely agitated nor reported escalation towards violence/ physicality.  was then adviced by ED attending that he was going to be subjected to standard ED protocol of ensuring medical evaluation; i.e. blood works, urinalysis, etc. "I don't care", he told ED attending.      He remained "defiant", dismissive, oppositional towards ED staff - again, no reported aggressive or violent behavior observed by  ED staff.  though, noted by writer to be guarded, scanning the environment but did not appear to be actively internally stimulated.  there was also no active verbalization of any SI or HI during my encounter with him.  apart from the aforementioned presentation, he is also noted to be underlyingly hostile, oppositional. he was offered PO PRN meds for which, there was no acknowledgment elicited whether he wanted to take or not.  he is malodorous, disheveled and gaunt. Pt refused to change into hospital gown.. given ongoing presentation with potential unpredictability, he was eventually medicated with Haldol 5mg IM + ativan 2mg IM + benadryl 50mg IM.  subsequently placed in 4 pt restraints. Pt refused the hospital gown and was incompletely dressed (in hospital gown)/ was half naked waist up.. transferred to the main  ED hallway. writer attempted to cover him with a blanket (from waist up). "don't cover me. take the blanket off", he told writer.      multiple attempts were made to engage Pt towards a meaningful conversation proved futile."    On interview, patient reports again his name is "Palmer Villalobos" with  "[birth year]."  Pt reports he was admitted because he was walking on the highway.  Pt reports he was walking to [place] to see friends.  Pt reports he was "prepared for the hike mentally."  Pt reports he is homeless and has been living on the streets the past few months.  Pt reports his family lost their home and has been homeless since.  Pt reports he has not seen or been in contact with his parents because his phone was shut off and he threw it out.  Pt reports he believes they are dead because they lost their jobs and other "clues" but does not elaborate.  Pt denies SI/HI/I/P or AH/VH or paranoia.  Pt denies changes in mood, sleep or appetite.  Pt reports he steals his food in order to eat.  Pt denies hx of suicide attempts or aggression.  Pt denies legal hx of trauma.  Pt reports he was previously in a psychiatric hospital last year and on medication but does not remember what medication.  Pt asked why he was admitted previously and reports he got in an argument with his mother.  Pt does not know any past diagnoses he may have had.  Pt reports his mother has a hx of using heroin and father crack cocaine.  Pt reports they both have been in psychiatric hospitals but he does not know what they were diagnosed with, but he believes psychosis.  Pt reports he finished high school and college and has worked in the past.

## 2023-06-30 NOTE — BH INPATIENT PSYCHIATRY ASSESSMENT NOTE - CURRENT MEDICATION
MEDICATIONS  (STANDING):  risperiDONE   Tablet 1 milliGRAM(s) Oral at bedtime    MEDICATIONS  (PRN):  diphenhydrAMINE Injectable 50 milliGRAM(s) IntraMuscular once PRN Menacing behavior  haloperidol     Tablet 5 milliGRAM(s) Oral every 6 hours PRN agitation  haloperidol    Injectable 5 milliGRAM(s) IntraMuscular Once PRN severe agitation  hydrOXYzine hydrochloride 50 milliGRAM(s) Oral every 6 hours PRN anxiety/ sleep disturbances  LORazepam     Tablet 2 milliGRAM(s) Oral every 6 hours PRN Agitation  LORazepam   Injectable 2 milliGRAM(s) IntraMuscular Once PRN severe agitation   MEDICATIONS  (STANDING):  risperiDONE   Tablet 2 milliGRAM(s) Oral at bedtime    MEDICATIONS  (PRN):  diphenhydrAMINE Injectable 50 milliGRAM(s) IntraMuscular once PRN Menacing behavior  haloperidol     Tablet 5 milliGRAM(s) Oral every 6 hours PRN agitation  haloperidol    Injectable 5 milliGRAM(s) IntraMuscular Once PRN severe agitation  hydrOXYzine hydrochloride 50 milliGRAM(s) Oral every 6 hours PRN anxiety/ sleep disturbances  LORazepam     Tablet 2 milliGRAM(s) Oral every 6 hours PRN Agitation  LORazepam   Injectable 2 milliGRAM(s) IntraMuscular Once PRN severe agitation   MEDICATIONS  (STANDING):  risperiDONE   Tablet 4 milliGRAM(s) Oral at bedtime    MEDICATIONS  (PRN):  diphenhydrAMINE 50 milliGRAM(s) Oral every 6 hours PRN insomnia/agitation/EPS prophylaxis  diphenhydrAMINE Injectable 50 milliGRAM(s) IntraMuscular once PRN Menacing behavior  haloperidol     Tablet 5 milliGRAM(s) Oral every 6 hours PRN agitation  haloperidol    Injectable 5 milliGRAM(s) IntraMuscular Once PRN severe agitation  hydrOXYzine hydrochloride 50 milliGRAM(s) Oral every 6 hours PRN anxiety/ sleep disturbances  LORazepam     Tablet 2 milliGRAM(s) Oral every 6 hours PRN agitation  LORazepam   Injectable 2 milliGRAM(s) IntraMuscular once PRN agitation  melatonin. 3 milliGRAM(s) Oral at bedtime PRN Insomnia

## 2023-06-30 NOTE — BH INPATIENT PSYCHIATRY ASSESSMENT NOTE - ATTENDING COMMENTS
Agree with A&P  May benefit from [facility] options for both safety and compliance  Denies any wish to harm self on unit  No NSSIB observed  CO not needed  Encouraged G&M therapy to build insight  MED f/u for eosinophilia/deranged CBC  Collateral needed

## 2023-06-30 NOTE — BH INPATIENT PSYCHIATRY ASSESSMENT NOTE - NS ED [facility] REVIEW OF ED CHART VITAL SIGNS REVIEWED
FAMILY HISTORY:  Family history of hypertension  Family history of stomach cancer  Family history of type 2 diabetes mellitus    
Yes

## 2023-06-30 NOTE — BH INPATIENT PSYCHIATRY ASSESSMENT NOTE - NSBHMSESPEECH_PSY_A_CORE
when he attempted to engage, volume was soft and there was no latency noted./Abnormal as indicated, otherwise normal... Abnormal as indicated, otherwise normal...

## 2023-06-30 NOTE — BH INPATIENT PSYCHIATRY ASSESSMENT NOTE - NSBHMETABOLIC_PSY_ALL_CORE_FT
BMI:   HbA1c:   Glucose:   BP: 133/85 (06-29-23 @ 19:55) (106/82 - 133/85)  Lipid Panel:  BMI:   HbA1c:   Glucose:   BP: 121/65 (07-03-23 @ 08:19) (121/65 - 121/65)  Lipid Panel:  BMI:   HbA1c:   Glucose:   BP: 120/82 (07-11-23 @ 08:14) (117/68 - 130/90)  Lipid Panel:

## 2023-06-30 NOTE — BH INPATIENT PSYCHIATRY ASSESSMENT NOTE - RISK ASSESSMENT
Protective factors:  no hx of suicide attempts or aggression, no current SI/HI/I/P, no hx of substance abuse    Risk factors:  male gender, current homelessness, noncompliance with treatment, current psychotic sx

## 2023-07-01 PROCEDURE — 99232 SBSQ HOSP IP/OBS MODERATE 35: CPT

## 2023-07-01 RX ADMIN — RISPERIDONE 1 MILLIGRAM(S): 4 TABLET ORAL at 20:51

## 2023-07-01 NOTE — BH INPATIENT PSYCHIATRY PROGRESS NOTE - CURRENT MEDICATION
MEDICATIONS  (STANDING):  risperiDONE   Tablet 1 milliGRAM(s) Oral at bedtime    MEDICATIONS  (PRN):  diphenhydrAMINE Injectable 50 milliGRAM(s) IntraMuscular once PRN Menacing behavior  haloperidol     Tablet 5 milliGRAM(s) Oral every 6 hours PRN agitation  haloperidol    Injectable 5 milliGRAM(s) IntraMuscular Once PRN severe agitation  hydrOXYzine hydrochloride 50 milliGRAM(s) Oral every 6 hours PRN anxiety/ sleep disturbances  LORazepam     Tablet 2 milliGRAM(s) Oral every 6 hours PRN Agitation  LORazepam   Injectable 2 milliGRAM(s) IntraMuscular Once PRN severe agitation

## 2023-07-01 NOTE — BH INPATIENT PSYCHIATRY PROGRESS NOTE - NSBHMSESPEECH_PSY_A_CORE
when he attempted to engage, volume was soft and there was no latency noted./Abnormal as indicated, otherwise normal...

## 2023-07-01 NOTE — BH INPATIENT PSYCHIATRY PROGRESS NOTE - NSBHCHARTREVIEWVS_PSY_A_CORE FT
Vital Signs Last 24 Hrs  T(C): 36 (06-30-23 @ 07:35), Max: 36 (06-30-23 @ 07:35)  T(F): 96.8 (06-30-23 @ 07:35), Max: 96.8 (06-30-23 @ 07:35)  HR: --  BP: --  BP(mean): --  RR: --  SpO2: --    Orthostatic VS  06-30-23 @ 07:35  Lying BP: --/-- HR: --  Sitting BP: 122/63 HR: 77  Standing BP: --/-- HR: --  Site: --  Mode: --  Orthostatic VS  06-30-23 @ 06:15  Lying BP: --/-- HR: --  Sitting BP: 144/85 HR: 77  Standing BP: 144/85 HR: 64  Site: --  Mode: --   Vital Signs Last 24 Hrs  T(C): --  T(F): --  HR: --  BP: --  BP(mean): --  RR: --  SpO2: --    Orthostatic VS  06-30-23 @ 07:35  Lying BP: --/-- HR: --  Sitting BP: 122/63 HR: 77  Standing BP: --/-- HR: --  Site: --  Mode: --  Orthostatic VS  06-30-23 @ 06:15  Lying BP: --/-- HR: --  Sitting BP: 144/85 HR: 77  Standing BP: 144/85 HR: 64  Site: --  Mode: --

## 2023-07-01 NOTE — BH INPATIENT PSYCHIATRY PROGRESS NOTE - GENERAL APPEARANCE
does not appear to be actively internally stimulated, young gaunt looking malodorous male - in appropriate dirty casual clothing/No deformities present

## 2023-07-01 NOTE — BH INPATIENT PSYCHIATRY PROGRESS NOTE - PRN MEDS
MEDICATIONS  (PRN):  diphenhydrAMINE Injectable 50 milliGRAM(s) IntraMuscular once PRN Menacing behavior  haloperidol     Tablet 5 milliGRAM(s) Oral every 6 hours PRN agitation  haloperidol    Injectable 5 milliGRAM(s) IntraMuscular Once PRN severe agitation  hydrOXYzine hydrochloride 50 milliGRAM(s) Oral every 6 hours PRN anxiety/ sleep disturbances  LORazepam     Tablet 2 milliGRAM(s) Oral every 6 hours PRN Agitation  LORazepam   Injectable 2 milliGRAM(s) IntraMuscular Once PRN severe agitation

## 2023-07-02 PROCEDURE — 99232 SBSQ HOSP IP/OBS MODERATE 35: CPT

## 2023-07-02 RX ORDER — RISPERIDONE 4 MG/1
2 TABLET ORAL AT BEDTIME
Refills: 0 | Status: DISCONTINUED | OUTPATIENT
Start: 2023-07-02 | End: 2023-07-05

## 2023-07-02 RX ADMIN — RISPERIDONE 2 MILLIGRAM(S): 4 TABLET ORAL at 20:21

## 2023-07-02 NOTE — BH INPATIENT PSYCHIATRY PROGRESS NOTE - CURRENT MEDICATION
MEDICATIONS  (STANDING):  risperiDONE   Tablet 2 milliGRAM(s) Oral at bedtime    MEDICATIONS  (PRN):  diphenhydrAMINE Injectable 50 milliGRAM(s) IntraMuscular once PRN Menacing behavior  haloperidol     Tablet 5 milliGRAM(s) Oral every 6 hours PRN agitation  haloperidol    Injectable 5 milliGRAM(s) IntraMuscular Once PRN severe agitation  hydrOXYzine hydrochloride 50 milliGRAM(s) Oral every 6 hours PRN anxiety/ sleep disturbances  LORazepam     Tablet 2 milliGRAM(s) Oral every 6 hours PRN Agitation  LORazepam   Injectable 2 milliGRAM(s) IntraMuscular Once PRN severe agitation

## 2023-07-03 RX ADMIN — RISPERIDONE 2 MILLIGRAM(S): 4 TABLET ORAL at 20:56

## 2023-07-03 NOTE — BH INPATIENT PSYCHIATRY PROGRESS NOTE - PRN MEDS
MEDICATIONS  (PRN):  diphenhydrAMINE Injectable 50 milliGRAM(s) IntraMuscular once PRN Menacing behavior  haloperidol     Tablet 5 milliGRAM(s) Oral every 6 hours PRN agitation  haloperidol    Injectable 5 milliGRAM(s) IntraMuscular Once PRN severe agitation  hydrOXYzine hydrochloride 50 milliGRAM(s) Oral every 6 hours PRN anxiety/ sleep disturbances  LORazepam     Tablet 2 milliGRAM(s) Oral every 6 hours PRN Agitation  LORazepam   Injectable 2 milliGRAM(s) IntraMuscular Once PRN severe agitation   MEDICATIONS  (PRN):  diphenhydrAMINE 50 milliGRAM(s) Oral every 6 hours PRN insomnia/agitation/EPS prophylaxis  diphenhydrAMINE Injectable 50 milliGRAM(s) IntraMuscular once PRN Menacing behavior  haloperidol     Tablet 5 milliGRAM(s) Oral every 6 hours PRN agitation  haloperidol    Injectable 5 milliGRAM(s) IntraMuscular Once PRN severe agitation  hydrOXYzine hydrochloride 50 milliGRAM(s) Oral every 6 hours PRN anxiety/ sleep disturbances  LORazepam     Tablet 2 milliGRAM(s) Oral every 6 hours PRN agitation  LORazepam   Injectable 2 milliGRAM(s) IntraMuscular once PRN agitation  melatonin. 3 milliGRAM(s) Oral at bedtime PRN Insomnia

## 2023-07-03 NOTE — BH INPATIENT PSYCHIATRY PROGRESS NOTE - NSBHCHARTREVIEWVS_PSY_A_CORE FT
Vital Signs Last 24 Hrs  T(C): 36.9 (07-03-23 @ 08:19), Max: 36.9 (07-03-23 @ 08:19)  T(F): 98.4 (07-03-23 @ 08:19), Max: 98.4 (07-03-23 @ 08:19)  HR: 87 (07-03-23 @ 08:19) (87 - 87)  BP: 121/65 (07-03-23 @ 08:19) (121/65 - 121/65)  BP(mean): --  RR: --  SpO2: --    Orthostatic VS  07-02-23 @ 20:31  Lying BP: --/-- HR: --  Sitting BP: 126/56 HR: 97  Standing BP: 116/57 HR: 80  Site: --  Mode: --   Vital Signs Last 24 Hrs  T(C): 35.7 (07-11-23 @ 08:14), Max: 35.7 (07-11-23 @ 08:14)  T(F): 96.3 (07-11-23 @ 08:14), Max: 96.3 (07-11-23 @ 08:14)  HR: 70 (07-11-23 @ 08:14) (70 - 70)  BP: 120/82 (07-11-23 @ 08:14) (120/82 - 120/82)  BP(mean): --  RR: --  SpO2: --

## 2023-07-03 NOTE — BH INPATIENT PSYCHIATRY PROGRESS NOTE - CURRENT MEDICATION
MEDICATIONS  (STANDING):  risperiDONE   Tablet 2 milliGRAM(s) Oral at bedtime    MEDICATIONS  (PRN):  diphenhydrAMINE Injectable 50 milliGRAM(s) IntraMuscular once PRN Menacing behavior  haloperidol     Tablet 5 milliGRAM(s) Oral every 6 hours PRN agitation  haloperidol    Injectable 5 milliGRAM(s) IntraMuscular Once PRN severe agitation  hydrOXYzine hydrochloride 50 milliGRAM(s) Oral every 6 hours PRN anxiety/ sleep disturbances  LORazepam     Tablet 2 milliGRAM(s) Oral every 6 hours PRN Agitation  LORazepam   Injectable 2 milliGRAM(s) IntraMuscular Once PRN severe agitation   MEDICATIONS  (STANDING):  risperiDONE   Tablet 4 milliGRAM(s) Oral at bedtime    MEDICATIONS  (PRN):  diphenhydrAMINE 50 milliGRAM(s) Oral every 6 hours PRN insomnia/agitation/EPS prophylaxis  diphenhydrAMINE Injectable 50 milliGRAM(s) IntraMuscular once PRN Menacing behavior  haloperidol     Tablet 5 milliGRAM(s) Oral every 6 hours PRN agitation  haloperidol    Injectable 5 milliGRAM(s) IntraMuscular Once PRN severe agitation  hydrOXYzine hydrochloride 50 milliGRAM(s) Oral every 6 hours PRN anxiety/ sleep disturbances  LORazepam     Tablet 2 milliGRAM(s) Oral every 6 hours PRN agitation  LORazepam   Injectable 2 milliGRAM(s) IntraMuscular once PRN agitation  melatonin. 3 milliGRAM(s) Oral at bedtime PRN Insomnia

## 2023-07-03 NOTE — BH INPATIENT PSYCHIATRY PROGRESS NOTE - NSBHMETABOLIC_PSY_ALL_CORE_FT
BMI:   HbA1c:   Glucose:   BP: 121/65 (07-03-23 @ 08:19) (121/65 - 121/65)  Lipid Panel:  BMI:   HbA1c:   Glucose:   BP: 120/82 (07-11-23 @ 08:14) (117/68 - 130/90)  Lipid Panel:

## 2023-07-04 PROCEDURE — 99232 SBSQ HOSP IP/OBS MODERATE 35: CPT

## 2023-07-04 RX ADMIN — RISPERIDONE 2 MILLIGRAM(S): 4 TABLET ORAL at 20:45

## 2023-07-05 PROCEDURE — 99232 SBSQ HOSP IP/OBS MODERATE 35: CPT

## 2023-07-05 RX ORDER — RISPERIDONE 4 MG/1
3 TABLET ORAL AT BEDTIME
Refills: 0 | Status: DISCONTINUED | OUTPATIENT
Start: 2023-07-05 | End: 2023-07-07

## 2023-07-05 RX ADMIN — RISPERIDONE 3 MILLIGRAM(S): 4 TABLET ORAL at 20:04

## 2023-07-05 NOTE — BH INPATIENT PSYCHIATRY PROGRESS NOTE - PRN MEDS
MEDICATIONS  (PRN):  diphenhydrAMINE Injectable 50 milliGRAM(s) IntraMuscular once PRN Menacing behavior  haloperidol     Tablet 5 milliGRAM(s) Oral every 6 hours PRN agitation  haloperidol    Injectable 5 milliGRAM(s) IntraMuscular Once PRN severe agitation  hydrOXYzine hydrochloride 50 milliGRAM(s) Oral every 6 hours PRN anxiety/ sleep disturbances  LORazepam     Tablet 2 milliGRAM(s) Oral every 6 hours PRN agitation  LORazepam   Injectable 2 milliGRAM(s) IntraMuscular once PRN agitation   MEDICATIONS  (PRN):  diphenhydrAMINE 50 milliGRAM(s) Oral every 6 hours PRN insomnia/agitation/EPS prophylaxis  diphenhydrAMINE Injectable 50 milliGRAM(s) IntraMuscular once PRN Menacing behavior  haloperidol     Tablet 5 milliGRAM(s) Oral every 6 hours PRN agitation  haloperidol    Injectable 5 milliGRAM(s) IntraMuscular Once PRN severe agitation  hydrOXYzine hydrochloride 50 milliGRAM(s) Oral every 6 hours PRN anxiety/ sleep disturbances  LORazepam     Tablet 2 milliGRAM(s) Oral every 6 hours PRN agitation  LORazepam   Injectable 2 milliGRAM(s) IntraMuscular once PRN agitation  melatonin. 3 milliGRAM(s) Oral at bedtime PRN Insomnia

## 2023-07-05 NOTE — BH INPATIENT PSYCHIATRY PROGRESS NOTE - CURRENT MEDICATION
MEDICATIONS  (STANDING):  risperiDONE   Tablet 3 milliGRAM(s) Oral at bedtime    MEDICATIONS  (PRN):  diphenhydrAMINE Injectable 50 milliGRAM(s) IntraMuscular once PRN Menacing behavior  haloperidol     Tablet 5 milliGRAM(s) Oral every 6 hours PRN agitation  haloperidol    Injectable 5 milliGRAM(s) IntraMuscular Once PRN severe agitation  hydrOXYzine hydrochloride 50 milliGRAM(s) Oral every 6 hours PRN anxiety/ sleep disturbances  LORazepam     Tablet 2 milliGRAM(s) Oral every 6 hours PRN agitation  LORazepam   Injectable 2 milliGRAM(s) IntraMuscular once PRN agitation   MEDICATIONS  (STANDING):  risperiDONE   Tablet 4 milliGRAM(s) Oral at bedtime    MEDICATIONS  (PRN):  diphenhydrAMINE 50 milliGRAM(s) Oral every 6 hours PRN insomnia/agitation/EPS prophylaxis  diphenhydrAMINE Injectable 50 milliGRAM(s) IntraMuscular once PRN Menacing behavior  haloperidol     Tablet 5 milliGRAM(s) Oral every 6 hours PRN agitation  haloperidol    Injectable 5 milliGRAM(s) IntraMuscular Once PRN severe agitation  hydrOXYzine hydrochloride 50 milliGRAM(s) Oral every 6 hours PRN anxiety/ sleep disturbances  LORazepam     Tablet 2 milliGRAM(s) Oral every 6 hours PRN agitation  LORazepam   Injectable 2 milliGRAM(s) IntraMuscular once PRN agitation  melatonin. 3 milliGRAM(s) Oral at bedtime PRN Insomnia

## 2023-07-05 NOTE — BH INPATIENT PSYCHIATRY PROGRESS NOTE - NSBHMETABOLIC_PSY_ALL_CORE_FT
BMI:   HbA1c:   Glucose:   BP: 113/73 (07-04-23 @ 20:48) (113/73 - 121/65)  Lipid Panel:  BMI:   HbA1c:   Glucose:   BP: 120/82 (07-11-23 @ 08:14) (117/68 - 130/90)  Lipid Panel:

## 2023-07-05 NOTE — BH INPATIENT PSYCHIATRY PROGRESS NOTE - NSBHCHARTREVIEWVS_PSY_A_CORE FT
Vital Signs Last 24 Hrs  T(C): 36.3 (07-05-23 @ 07:39), Max: 36.3 (07-05-23 @ 07:39)  T(F): 97.3 (07-05-23 @ 07:39), Max: 97.3 (07-05-23 @ 07:39)  HR: 72 (07-04-23 @ 20:48) (72 - 72)  BP: 113/73 (07-04-23 @ 20:48) (113/73 - 113/73)  BP(mean): --  RR: --  SpO2: --    Orthostatic VS  07-05-23 @ 07:39  Lying BP: --/-- HR: --  Sitting BP: 106/69 HR: 73  Standing BP: --/-- HR: --  Site: --  Mode: --  Orthostatic VS  07-04-23 @ 07:45  Lying BP: --/-- HR: --  Sitting BP: 117/67 HR: 66  Standing BP: --/-- HR: --  Site: --  Mode: --  Orthostatic VS  07-03-23 @ 20:44  Lying BP: --/-- HR: --  Sitting BP: 118/68 HR: 61  Standing BP: 103/61 HR: 76  Site: --  Mode: --   Vital Signs Last 24 Hrs  T(C): 35.7 (07-11-23 @ 08:14), Max: 35.7 (07-11-23 @ 08:14)  T(F): 96.3 (07-11-23 @ 08:14), Max: 96.3 (07-11-23 @ 08:14)  HR: 70 (07-11-23 @ 08:14) (70 - 70)  BP: 120/82 (07-11-23 @ 08:14) (120/82 - 120/82)  BP(mean): --  RR: --  SpO2: --

## 2023-07-05 NOTE — BH INPATIENT PSYCHIATRY PROGRESS NOTE - NSBHMSESPABN_PSY_A_CORE
Soft volume/Decreased productivity

## 2023-07-05 NOTE — BH INPATIENT PSYCHIATRY PROGRESS NOTE - NSBHMSEPERCEPT_PSY_A_CORE
[Fever] : no fever [Eye Discharge] : eye discharge [Eye Redness] : eye redness [Nasal Congestion] : nasal congestion [Sore Throat] : sore throat [Cough] : cough [Vomiting] : no vomiting [Diarrhea] : no diarrhea but does not appear to be actively internally stimulated/No abnormalities No abnormalities

## 2023-07-06 PROCEDURE — 99232 SBSQ HOSP IP/OBS MODERATE 35: CPT

## 2023-07-06 RX ADMIN — RISPERIDONE 3 MILLIGRAM(S): 4 TABLET ORAL at 20:13

## 2023-07-06 NOTE — BH INPATIENT PSYCHIATRY PROGRESS NOTE - NSBHMSESPEECH_PSY_A_CORE
when he attempted to engage, volume was soft and there was no latency noted./Normal volume, rate, productivity, spontaneity and articulation Normal volume, rate, productivity, spontaneity and articulation

## 2023-07-06 NOTE — BH TREATMENT PLAN - NSTXDISORGINTERPR_PSY_ALL_CORE
Psychiatric rehabilitation staff will provide encouragement, support, psychotherapy, and psychoeducation to assist the patient in the progression of his treatment goal.
Psychiatric rehabilitation staff will provide encouragement, support, psychotherapy, and psychoeducation to assist the patient in the progression of his treatment goal.

## 2023-07-06 NOTE — BH TREATMENT PLAN - NSTXDCHOUSINTERSW_PSY_ALL_CORE
Care coordinator to be referred patient to receive services.
Care coordinator to be referred patient to receive services.

## 2023-07-06 NOTE — BH TREATMENT PLAN - NSTXCONFINTERRN_PSY_ALL_CORE
Pt will repsond dto more than 50% of questions asked from team.
Pt will repsond dto more than 50% of questions asked from team.

## 2023-07-06 NOTE — BH TREATMENT PLAN - NSTXPATIENTPARTICIPATE_PSY_ALL_CORE
Patient participated in defining interventions
Patient participated in defining interventions/Patient participated in development of after care plan

## 2023-07-06 NOTE — BH INPATIENT PSYCHIATRY PROGRESS NOTE - NSBHCHARTREVIEWVS_PSY_A_CORE FT
Vital Signs Last 24 Hrs  T(C): 36.8 (07-06-23 @ 08:29), Max: 36.8 (07-06-23 @ 08:29)  T(F): 98.2 (07-06-23 @ 08:29), Max: 98.2 (07-06-23 @ 08:29)  HR: 60 (07-05-23 @ 20:50) (60 - 60)  BP: 109/63 (07-06-23 @ 08:29) (109/63 - 112/65)  BP(mean): 92 (07-06-23 @ 08:29) (92 - 92)  RR: --  SpO2: --    Orthostatic VS  07-05-23 @ 07:39  Lying BP: --/-- HR: --  Sitting BP: 106/69 HR: 73  Standing BP: --/-- HR: --  Site: --  Mode: --   Vital Signs Last 24 Hrs  T(C): 35.7 (07-11-23 @ 08:14), Max: 35.7 (07-11-23 @ 08:14)  T(F): 96.3 (07-11-23 @ 08:14), Max: 96.3 (07-11-23 @ 08:14)  HR: 70 (07-11-23 @ 08:14) (70 - 70)  BP: 120/82 (07-11-23 @ 08:14) (120/82 - 120/82)  BP(mean): --  RR: --  SpO2: --

## 2023-07-06 NOTE — BH INPATIENT PSYCHIATRY PROGRESS NOTE - NSBHMSEAFFRANGE_PSY_A_CORE
as of 4:30PM,  continues to be blunted/Blunted Pt has h/o hypothyroidism  started on home med of levothyroxine 75mcg Blunted

## 2023-07-06 NOTE — BH INPATIENT PSYCHIATRY PROGRESS NOTE - NSBHMETABOLIC_PSY_ALL_CORE_FT
BMI:   HbA1c:   Glucose:   BP: 109/63 (07-06-23 @ 08:29) (109/63 - 113/73)  Lipid Panel:  BMI:   HbA1c:   Glucose:   BP: 120/82 (07-11-23 @ 08:14) (117/68 - 130/90)  Lipid Panel:

## 2023-07-06 NOTE — BH TREATMENT PLAN - NSTXDISORGINTERRN_PSY_ALL_CORE
PT will repsond to questions with out being internally occupied or selectively mute.
PT will repsond to questions with out being internally occupied or selectively mute.

## 2023-07-07 PROCEDURE — 99231 SBSQ HOSP IP/OBS SF/LOW 25: CPT

## 2023-07-07 RX ORDER — RISPERIDONE 4 MG/1
4 TABLET ORAL AT BEDTIME
Refills: 0 | Status: DISCONTINUED | OUTPATIENT
Start: 2023-07-07 | End: 2023-07-13

## 2023-07-07 RX ADMIN — RISPERIDONE 4 MILLIGRAM(S): 4 TABLET ORAL at 20:27

## 2023-07-07 NOTE — BH INPATIENT PSYCHIATRY PROGRESS NOTE - CURRENT MEDICATION
MEDICATIONS  (STANDING):  risperiDONE   Tablet 4 milliGRAM(s) Oral at bedtime    MEDICATIONS  (PRN):  diphenhydrAMINE Injectable 50 milliGRAM(s) IntraMuscular once PRN Menacing behavior  haloperidol     Tablet 5 milliGRAM(s) Oral every 6 hours PRN agitation  haloperidol    Injectable 5 milliGRAM(s) IntraMuscular Once PRN severe agitation  hydrOXYzine hydrochloride 50 milliGRAM(s) Oral every 6 hours PRN anxiety/ sleep disturbances  LORazepam     Tablet 2 milliGRAM(s) Oral every 6 hours PRN agitation  LORazepam   Injectable 2 milliGRAM(s) IntraMuscular once PRN agitation   MEDICATIONS  (STANDING):  risperiDONE   Tablet 4 milliGRAM(s) Oral at bedtime    MEDICATIONS  (PRN):  diphenhydrAMINE 50 milliGRAM(s) Oral every 6 hours PRN insomnia/agitation/EPS prophylaxis  diphenhydrAMINE Injectable 50 milliGRAM(s) IntraMuscular once PRN Menacing behavior  haloperidol     Tablet 5 milliGRAM(s) Oral every 6 hours PRN agitation  haloperidol    Injectable 5 milliGRAM(s) IntraMuscular Once PRN severe agitation  hydrOXYzine hydrochloride 50 milliGRAM(s) Oral every 6 hours PRN anxiety/ sleep disturbances  LORazepam     Tablet 2 milliGRAM(s) Oral every 6 hours PRN agitation  LORazepam   Injectable 2 milliGRAM(s) IntraMuscular once PRN agitation  melatonin. 3 milliGRAM(s) Oral at bedtime PRN Insomnia

## 2023-07-07 NOTE — BH INPATIENT PSYCHIATRY PROGRESS NOTE - NSBHMETABOLIC_PSY_ALL_CORE_FT
BMI:   HbA1c:   Glucose:   BP: 98/60 (07-07-23 @ 08:19) (98/60 - 113/73)  Lipid Panel:  BMI:   HbA1c:   Glucose:   BP: 120/82 (07-11-23 @ 08:14) (117/68 - 130/90)  Lipid Panel:

## 2023-07-07 NOTE — BH INPATIENT PSYCHIATRY PROGRESS NOTE - NSBHCHARTREVIEWVS_PSY_A_CORE FT
Vital Signs Last 24 Hrs  T(C): 36.6 (07-07-23 @ 08:19), Max: 36.6 (07-07-23 @ 08:19)  T(F): 97.9 (07-07-23 @ 08:19), Max: 97.9 (07-07-23 @ 08:19)  HR: 75 (07-07-23 @ 08:19) (75 - 75)  BP: 98/60 (07-07-23 @ 08:19) (98/60 - 98/60)  BP(mean): --  RR: --  SpO2: --     Vital Signs Last 24 Hrs  T(C): 35.7 (07-11-23 @ 08:14), Max: 35.7 (07-11-23 @ 08:14)  T(F): 96.3 (07-11-23 @ 08:14), Max: 96.3 (07-11-23 @ 08:14)  HR: 70 (07-11-23 @ 08:14) (70 - 70)  BP: 120/82 (07-11-23 @ 08:14) (120/82 - 120/82)  BP(mean): --  RR: --  SpO2: --

## 2023-07-08 RX ADMIN — RISPERIDONE 4 MILLIGRAM(S): 4 TABLET ORAL at 20:26

## 2023-07-09 RX ORDER — DIPHENHYDRAMINE HCL 50 MG
50 CAPSULE ORAL EVERY 6 HOURS
Refills: 0 | Status: DISCONTINUED | OUTPATIENT
Start: 2023-07-09 | End: 2023-07-13

## 2023-07-09 RX ORDER — LANOLIN ALCOHOL/MO/W.PET/CERES
3 CREAM (GRAM) TOPICAL AT BEDTIME
Refills: 0 | Status: DISCONTINUED | OUTPATIENT
Start: 2023-07-09 | End: 2023-07-13

## 2023-07-09 RX ADMIN — Medication 3 MILLIGRAM(S): at 20:59

## 2023-07-09 RX ADMIN — RISPERIDONE 4 MILLIGRAM(S): 4 TABLET ORAL at 20:07

## 2023-07-10 PROCEDURE — 99231 SBSQ HOSP IP/OBS SF/LOW 25: CPT

## 2023-07-10 RX ADMIN — RISPERIDONE 4 MILLIGRAM(S): 4 TABLET ORAL at 20:11

## 2023-07-10 RX ADMIN — Medication 3 MILLIGRAM(S): at 20:11

## 2023-07-10 NOTE — BH INPATIENT PSYCHIATRY PROGRESS NOTE - NSBHCHARTREVIEWVS_PSY_A_CORE FT
Vital Signs Last 24 Hrs  T(C): 36 (07-10-23 @ 06:43), Max: 36 (07-10-23 @ 06:43)  T(F): 96.8 (07-10-23 @ 06:43), Max: 96.8 (07-10-23 @ 06:43)  HR: 64 (07-10-23 @ 06:43) (64 - 64)  BP: 130/90 (07-10-23 @ 06:43) (130/90 - 130/90)  BP(mean): --  RR: --  SpO2: --     Vital Signs Last 24 Hrs  T(C): 35.7 (07-11-23 @ 08:14), Max: 35.7 (07-11-23 @ 08:14)  T(F): 96.3 (07-11-23 @ 08:14), Max: 96.3 (07-11-23 @ 08:14)  HR: 70 (07-11-23 @ 08:14) (70 - 70)  BP: 120/82 (07-11-23 @ 08:14) (120/82 - 120/82)  BP(mean): --  RR: --  SpO2: --

## 2023-07-10 NOTE — BH INPATIENT PSYCHIATRY PROGRESS NOTE - PRN MEDS
MEDICATIONS  (PRN):  diphenhydrAMINE 50 milliGRAM(s) Oral every 6 hours PRN insomnia/agitation/EPS prophylaxis  diphenhydrAMINE Injectable 50 milliGRAM(s) IntraMuscular once PRN Menacing behavior  haloperidol     Tablet 5 milliGRAM(s) Oral every 6 hours PRN agitation  haloperidol    Injectable 5 milliGRAM(s) IntraMuscular Once PRN severe agitation  hydrOXYzine hydrochloride 50 milliGRAM(s) Oral every 6 hours PRN anxiety/ sleep disturbances  LORazepam     Tablet 2 milliGRAM(s) Oral every 6 hours PRN agitation  LORazepam   Injectable 2 milliGRAM(s) IntraMuscular once PRN agitation  melatonin. 3 milliGRAM(s) Oral at bedtime PRN Insomnia

## 2023-07-10 NOTE — BH INPATIENT PSYCHIATRY PROGRESS NOTE - CURRENT MEDICATION
MEDICATIONS  (STANDING):  risperiDONE   Tablet 4 milliGRAM(s) Oral at bedtime    MEDICATIONS  (PRN):  diphenhydrAMINE 50 milliGRAM(s) Oral every 6 hours PRN insomnia/agitation/EPS prophylaxis  diphenhydrAMINE Injectable 50 milliGRAM(s) IntraMuscular once PRN Menacing behavior  haloperidol     Tablet 5 milliGRAM(s) Oral every 6 hours PRN agitation  haloperidol    Injectable 5 milliGRAM(s) IntraMuscular Once PRN severe agitation  hydrOXYzine hydrochloride 50 milliGRAM(s) Oral every 6 hours PRN anxiety/ sleep disturbances  LORazepam     Tablet 2 milliGRAM(s) Oral every 6 hours PRN agitation  LORazepam   Injectable 2 milliGRAM(s) IntraMuscular once PRN agitation  melatonin. 3 milliGRAM(s) Oral at bedtime PRN Insomnia

## 2023-07-10 NOTE — BH INPATIENT PSYCHIATRY PROGRESS NOTE - NSBHMETABOLIC_PSY_ALL_CORE_FT
BMI:   HbA1c:   Glucose:   BP: 130/90 (07-10-23 @ 06:43) (117/65 - 130/90)  Lipid Panel:  BMI:   HbA1c:   Glucose:   BP: 120/82 (07-11-23 @ 08:14) (117/68 - 130/90)  Lipid Panel:

## 2023-07-11 PROCEDURE — 99238 HOSP IP/OBS DSCHRG MGMT 30/<: CPT

## 2023-07-11 RX ORDER — RISPERIDONE 4 MG/1
1 TABLET ORAL
Qty: 0 | Refills: 0 | DISCHARGE
Start: 2023-07-11

## 2023-07-11 RX ADMIN — RISPERIDONE 4 MILLIGRAM(S): 4 TABLET ORAL at 20:22

## 2023-07-11 RX ADMIN — Medication 3 MILLIGRAM(S): at 20:22

## 2023-07-11 NOTE — BH INPATIENT PSYCHIATRY PROGRESS NOTE - GENERAL APPEARANCE
Problem: Infection  Goal: Will remain free from infection  Outcome: PROGRESSING AS EXPECTED  Intervention: Implement standard precautions and perform hand washing before and after patient contact  Note: Adhere to isolation precautions.     Problem: Respiratory:  Goal: Respiratory status will improve  Outcome: PROGRESSING AS EXPECTED  Intervention: Educate and encourage incentive spirometry usage  Note: Monitor respiratory status overnight. Instructed to use IS q hr while awake.      does not appear to be actively internally stimulated, young gaunt looking malodorous male - in appropriate dirty casual clothing/No deformities present No deformities present
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
## 2023-07-11 NOTE — BH INPATIENT PSYCHIATRY DISCHARGE NOTE - DETAILS
Pt reports mother has hx of heroin use and father hx of crack cocaine use.  He reports they both have been in psychiatric hospitals but he does not know what they are diagnosed with, possibly psychosis

## 2023-07-11 NOTE — BH INPATIENT PSYCHIATRY PROGRESS NOTE - NSBHCHARTREVIEWVS_PSY_A_CORE FT
Vital Signs Last 24 Hrs  T(C): 35.7 (07-11-23 @ 08:14), Max: 35.7 (07-11-23 @ 08:14)  T(F): 96.3 (07-11-23 @ 08:14), Max: 96.3 (07-11-23 @ 08:14)  HR: 70 (07-11-23 @ 08:14) (70 - 70)  BP: 120/82 (07-11-23 @ 08:14) (120/82 - 120/82)  BP(mean): --  RR: --  SpO2: --

## 2023-07-11 NOTE — BH INPATIENT PSYCHIATRY DISCHARGE NOTE - NSBHDCHANDOFFNOFT_PSY_A_CORE
Attempted to give handoff to a provider at [facility] ([phone]) but no provider assigned yet.  SW to fax discharge summary.  Can call Mary Beth Dotson NP at ([phone]) for handoff

## 2023-07-11 NOTE — BH INPATIENT PSYCHIATRY DISCHARGE NOTE - HOSPITAL COURSE
On admission, patient reported his name to be "Palmer Villalobos" as he reported in the ED, but he did not have any ID on him for it to be verified.  Pt was guarded, disorganized, illogical with impaired reasoning.  Pt was started on Risperdal titrated to 4mg PO at bedtime.  On the medication, patient showed much improvement in psychotic sx.  Pt planned to stay with a friend and engage in treatment.  Pt denied thoughts to walk to [place].  Pt declined referral for care coordination despite treatment team recommendations.

## 2023-07-11 NOTE — BH INPATIENT PSYCHIATRY DISCHARGE NOTE - HPI (INCLUDE ILLNESS QUALITY, SEVERITY, DURATION, TIMING, CONTEXT, MODIFYING FACTORS, ASSOCIATED SIGNS AND SYMPTOMS)
Per [facility] ED assessment, "currently given ID as CALVIN NICHOLAS  MRN: [id number]   on initial encounter with ED attending, he provided a name: "Palmer Villalobos, : 1998"  there is NO yield on CVM or PSYCKES with the above name provided  nothing on the [facility]   Reference #: [id number]  as well as [facility] [address]/ [facility] site  nothing on the Department of Corrections and Community Supervision Incarcerated Lookup ([email or web address].[place].gov/)    young male, unknown marital status, unknown domicility and unknown employment status. unknown psych hx - no yield per hospital data base; unknown medical hx; unknown illicit substance use. this morning presented to the ED BIB [place] + [employer] (in hand cuffs but NOT UNDER ARREST) after concerned motorist(s) activated 911 as Pt reportedly had been wandering on a highway.     per EMS, on scene, the Pt was uncooperative. he refused to provide a name and .  Pt overall refused to talk. there was NO reported aggression or violence.  EMS reported that the Pt had been walking along the intersection of the [address]. Pt reportedly attempted to "walk away" when police arrived on scene.      initial encounter made by ED attending at the triage: reported that Pt did not have eye contact; initially not answering questions.. then provided attending that his name was: "Palmer Villalobos with : 1998". not on any meds. claimed that he was "trying to leave [facility]" but did not elaborate on this further.  did not possess any Identification documents on him. noted to be irritable, dismissive but not observed to be severely agitated nor reported escalation towards violence/ physicality.  was then adviced by ED attending that he was going to be subjected to standard ED protocol of ensuring medical evaluation; i.e. blood works, urinalysis, etc. "I don't care", he told ED attending.      He remained "defiant", dismissive, oppositional towards ED staff - again, no reported aggressive or violent behavior observed by  ED staff.  though, noted by writer to be guarded, scanning the environment but did not appear to be actively internally stimulated.  there was also no active verbalization of any SI or HI during my encounter with him.  apart from the aforementioned presentation, he is also noted to be underlyingly hostile, oppositional. he was offered PO PRN meds for which, there was no acknowledgment elicited whether he wanted to take or not.  he is malodorous, disheveled and gaunt. Pt refused to change into hospital gown.. given ongoing presentation with potential unpredictability, he was eventually medicated with Haldol 5mg IM + ativan 2mg IM + benadryl 50mg IM.  subsequently placed in 4 pt restraints. Pt refused the hospital gown and was incompletely dressed (in hospital gown)/ was half naked waist up.. transferred to the main  ED hallway. writer attempted to cover him with a blanket (from waist up). "don't cover me. take the blanket off", he told writer.      multiple attempts were made to engage Pt towards a meaningful conversation proved futile."    On interview, patient reports again his name is "Palemr Villalobos" with  "[birth year]."  Pt reports he was admitted because he was walking on the highway.  Pt reports he was walking to [place] to see friends.  Pt reports he was "prepared for the hike mentally."  Pt reports he is homeless and has been living on the streets the past few months.  Pt reports his family lost their home and has been homeless since.  Pt reports he has not seen or been in contact with his parents because his phone was shut off and he threw it out.  Pt reports he believes they are dead because they lost their jobs and other "clues" but does not elaborate.  Pt denies SI/HI/I/P or AH/VH or paranoia.  Pt denies changes in mood, sleep or appetite.  Pt reports he steals his food in order to eat.  Pt denies hx of suicide attempts or aggression.  Pt denies legal hx of trauma.  Pt reports he was previously in a psychiatric hospital last year and on medication but does not remember what medication.  Pt asked why he was admitted previously and reports he got in an argument with his mother.  Pt does not know any past diagnoses he may have had.  Pt reports his mother has a hx of using heroin and father crack cocaine.  Pt reports they both have been in psychiatric hospitals but he does not know what they were diagnosed with, but he believes psychosis.  Pt reports he finished high school and college and has worked in the past.

## 2023-07-11 NOTE — BH INPATIENT PSYCHIATRY DISCHARGE NOTE - NSBHFUPINTERVALHXFT_PSY_A_CORE
Pt compliant with medication and tolerating it well.  Chart reviewed and case discussed with treatment team.  No events reported overnight.  Pt visible on the unit, socializing with peers.  Pt denies SI/HI/I/P or AH/VH or paranoia.  Pt eating and sleeping well.  Pt endorses motivation to continue medication as prescribed and engage in outpatient treatment

## 2023-07-12 VITALS — TEMPERATURE: 98 F

## 2023-07-12 PROCEDURE — 99231 SBSQ HOSP IP/OBS SF/LOW 25: CPT

## 2023-07-12 RX ORDER — RISPERIDONE 4 MG/1
1 TABLET ORAL
Qty: 30 | Refills: 0
Start: 2023-07-12 | End: 2023-08-10

## 2023-07-12 RX ADMIN — Medication 3 MILLIGRAM(S): at 20:01

## 2023-07-12 RX ADMIN — RISPERIDONE 4 MILLIGRAM(S): 4 TABLET ORAL at 20:01

## 2023-07-12 NOTE — BH DISCHARGE NOTE NURSING/SOCIAL WORK/PSYCH REHAB - PATIENT PORTAL LINK FT
You can access the FollowMyHealth Patient Portal offered by [facility] by registering at the following website: [email or web address]. By joining [facility]’s FollowMyHealth portal, you will also be able to view your health information using other applications (apps) compatible with our system.

## 2023-07-12 NOTE — BH INPATIENT PSYCHIATRY PROGRESS NOTE - NSTXCONFGOAL_PSY_ALL_CORE
Will ask for assistance as required

## 2023-07-12 NOTE — BH INPATIENT PSYCHIATRY PROGRESS NOTE - NSTXCONFDATETRGT_PSY_ALL_CORE
12-Jul-2023
07-Jul-2023
12-Jul-2023

## 2023-07-12 NOTE — BH INPATIENT PSYCHIATRY PROGRESS NOTE - NSBHASSESSSUMMFT_PSY_ALL_CORE
young male, unknown marital status, unknown domicility and unknown employment status. unknown psych hx - no yield per hospital data base; unknown medical hx; unknown illicit substance use. this morning presented to the ED BIB [place] + [employer] (in hand cuffs but NOT UNDER ARREST) after concerned motorist(s) activated 911 as Pt reportedly had been wandering on a highway.     >Legal: 9.39 INVOL  >Obs: Routine, no current SI. no need for CO, patient not expected to pose risk to self or others in controlled inpatient setting  >Psychiatric Meds: Titrate Risperdal 4mg PO at bedtime (psychosis)  >Labs: Admission labs reviewed, no acute findings.   >Medical:  No acute concerns. No consultations needed at this time.   >Social: milieu/structured therapy  >Treatment Interventions: Groups and Individual Therapy/CBT  >Dispo: pending remission of sx
young male, unknown marital status, unknown domicility and unknown employment status. unknown psych hx - no yield per hospital data base; unknown medical hx; unknown illicit substance use. this morning presented to the ED BIB EMS + [employer] (in hand cuffs but NOT UNDER ARREST) after concerned motorist(s) activated 911 as Pt reportedly had been [place].     >Legal: 9.39 INVOL  >Obs: Routine, no current SI. no need for CO, patient not expected to pose risk to self or others in controlled inpatient setting  >Psychiatric Meds: Titrate Risperdal 4mg PO at bedtime (psychosis)  >Labs: Admission labs reviewed, no acute findings.   >Medical:  No acute concerns. No consultations needed at this time.   >Social: milieu/structured therapy  >Treatment Interventions: Groups and Individual Therapy/CBT  >Dispo: pending remission of sx
young male, unknown marital status, unknown domicility and unknown employment status. unknown psych hx - no yield per hospital data base; unknown medical hx; unknown illicit substance use. this morning presented to the ED BIB EMS [place] [employer] (in hand cuffs but NOT UNDER ARREST) after concerned motorist(s) activated 911 as Pt reportedly had been wandering on a highway.     >Legal: 9.39 INVOL  >Obs: Routine, no current SI. no need for CO, patient not expected to pose risk to self or others in controlled inpatient setting  >Psychiatric Meds: Titrate Risperdal 4mg PO at bedtime (psychosis)  >Labs: Admission labs reviewed, no acute findings.   >Medical:  No acute concerns. No consultations needed at this time.   >Social: milieu/structured therapy  >Treatment Interventions: Groups and Individual Therapy/CBT  >Dispo: pending referral for outpatient care
young male, unknown marital status, unknown domicility and unknown employment status. unknown psych hx - no yield per hospital data base; unknown medical hx; unknown illicit substance use. this morning presented to the ED BIB EMS + [employer] (in hand cuffs but NOT UNDER ARREST) after concerned motorist(s) activated 911 as Pt reportedly had been [place].     >Legal: 9.39 INVOL  >Obs: Routine, no current SI. no need for CO, patient not expected to pose risk to self or others in controlled inpatient setting  >Psychiatric Meds: Titrate Risperdal 3mg PO at bedtime (psychosis)  >Labs: Admission labs reviewed, no acute findings.   >Medical:  No acute concerns. No consultations needed at this time.   >Social: milieu/structured therapy  >Treatment Interventions: Groups and Individual Therapy/CBT  >Dispo: pending remission of sx
young male, unknown marital status, unknown domicility and unknown employment status. unknown psych hx - no yield per hospital data base; unknown medical hx; unknown illicit substance use. this morning presented to the ED BIB EMS + [employer] (in hand cuffs but NOT UNDER ARREST) after concerned motorist(s) activated 911 as Pt reportedly had been [place].     >Legal: 9.39 INVOL  >Obs: Routine, no current SI. no need for CO, patient not expected to pose risk to self or others in controlled inpatient setting  >Psychiatric Meds: Risperdal 1mg PO at bedtime (psychosis)  >Labs: Admission labs reviewed, no acute findings.   >Medical:  No acute concerns. No consultations needed at this time.   >Social: milieu/structured therapy  >Treatment Interventions: Groups and Individual Therapy/CBT  >Dispo: pending remission of sx
young male, unknown marital status, unknown domicility and unknown employment status. unknown psych hx - no yield per hospital data base; unknown medical hx; unknown illicit substance use. this morning presented to the ED BIB EMS + [employer] (in hand cuffs but NOT UNDER ARREST) after concerned motorist(s) activated 911 as Pt reportedly had been [place].     >Legal: 9.39 INVOL  >Obs: Routine, no current SI. no need for CO, patient not expected to pose risk to self or others in controlled inpatient setting  >Psychiatric Meds: Titrate Risperdal 2mg PO at bedtime (psychosis)  >Labs: Admission labs reviewed, no acute findings.   >Medical:  No acute concerns. No consultations needed at this time.   >Social: milieu/structured therapy  >Treatment Interventions: Groups and Individual Therapy/CBT  >Dispo: pending remission of sx
young male, unknown marital status, unknown domicility and unknown employment status. unknown psych hx - no yield per hospital data base; unknown medical hx; unknown illicit substance use. this morning presented to the ED BIB EMS + [employer] (in hand cuffs but NOT UNDER ARREST) after concerned motorist(s) activated 911 as Pt reportedly had been [place].     >Legal: 9.39 INVOL  >Obs: Routine, no current SI. no need for CO, patient not expected to pose risk to self or others in controlled inpatient setting  >Psychiatric Meds: Titrate Risperdal 3mg PO at bedtime (psychosis)  >Labs: Admission labs reviewed, no acute findings.   >Medical:  No acute concerns. No consultations needed at this time.   >Social: milieu/structured therapy  >Treatment Interventions: Groups and Individual Therapy/CBT  >Dispo: pending remission of sx
young male, unknown marital status, unknown domicility and unknown employment status. unknown psych hx - no yield per hospital data base; unknown medical hx; unknown illicit substance use. this morning presented to the ED BIB EMS + [employer] (in hand cuffs but NOT UNDER ARREST) after concerned motorist(s) activated 911 as Pt reportedly had been [place].     >Legal: 9.39 INVOL  >Obs: Routine, no current SI. no need for CO, patient not expected to pose risk to self or others in controlled inpatient setting  >Psychiatric Meds: Titrate Risperdal 2mg PO at bedtime (psychosis)  >Labs: Admission labs reviewed, no acute findings.   >Medical:  No acute concerns. No consultations needed at this time.   >Social: milieu/structured therapy  >Treatment Interventions: Groups and Individual Therapy/CBT  >Dispo: pending remission of sx
young male, unknown marital status, unknown domicility and unknown employment status. unknown psych hx - no yield per hospital data base; unknown medical hx; unknown illicit substance use. this morning presented to the ED BIB EMS + [employer] (in hand cuffs but NOT UNDER ARREST) after concerned motorist(s) activated 911 as Pt reportedly had been [place].     >Legal: 9.39 INVOL  >Obs: Routine, no current SI. no need for CO, patient not expected to pose risk to self or others in controlled inpatient setting  >Psychiatric Meds: Titrate Risperdal 4mg PO at bedtime (psychosis)  >Labs: Admission labs reviewed, no acute findings.   >Medical:  No acute concerns. No consultations needed at this time.   >Social: milieu/structured therapy  >Treatment Interventions: Groups and Individual Therapy/CBT  >Dispo: pending remission of sx
young male, unknown marital status, unknown domicility and unknown employment status. unknown psych hx - no yield per hospital data base; unknown medical hx; unknown illicit substance use. this morning presented to the ED BIB EMS [place] [employer] (in hand cuffs but NOT UNDER ARREST) after concerned motorist(s) activated 911 as Pt reportedly had been [place].     >Legal: 9.39 INVOL  >Obs: Routine, no current SI. no need for CO, patient not expected to pose risk to self or others in controlled inpatient setting  >Psychiatric Meds: Titrate Risperdal 2mg PO at bedtime (psychosis)  >Labs: Admission labs reviewed, no acute findings.   >Medical:  No acute concerns. No consultations needed at this time.   >Social: milieu/structured therapy  >Treatment Interventions: Groups and Individual Therapy/CBT  >Dispo: pending remission of sx

## 2023-07-12 NOTE — BH INPATIENT PSYCHIATRY PROGRESS NOTE - NSBHATTESTBILLING_PSY_A_CORE
83827-Yurboyggtb OBS or IP - low complexity OR 25-34 mins
65412-Ydfjmedfrm OBS or IP - low complexity OR 25-34 mins
11190-Yosuwpvbco OBS or IP - moderate complexity OR 35-49 mins
52281-Bpoakfvnty OBS or IP - moderate complexity OR 35-49 mins
44437-Ysztyzbodu OBS or IP - moderate complexity OR 35-49 mins
00255-Dnwcsbwgsc OBS or IP - moderate complexity OR 35-49 mins
31439-Fuotbvrzgk OBS or IP - moderate complexity OR 35-49 mins
75598-Fgruqmgffo OBS or IP - moderate complexity OR 35-49 mins
00889-Tjursidcit OBS or IP - moderate complexity OR 35-49 mins
39136-Nxynwxgakq OBS or IP - low complexity OR 25-34 mins

## 2023-07-12 NOTE — BH SAFETY PLAN - DISTRACTION PHONE 2
Addended by: KEYANA MICHELLE on: 5/13/2021 07:09 AM     Modules accepted: Orders    
*In my contacts*

## 2023-07-12 NOTE — BH INPATIENT PSYCHIATRY PROGRESS NOTE - NSTXCONFPROGRES_PSY_ALL_CORE
No Change
Met - goal discontinued

## 2023-07-12 NOTE — BH INPATIENT PSYCHIATRY PROGRESS NOTE - NSTXPROBDCHOUS_PSY_ALL_CORE
DISCHARGE ISSUE - LACK OF APPROPRIATE HOUSING

## 2023-07-12 NOTE — BH INPATIENT PSYCHIATRY PROGRESS NOTE - NSBHFUPINTERVALHXFT_PSY_A_CORE
Pt compliant with medication and tolerating it well.  Chart reviewed and case discussed with treatment team.  No events reported overnight.  Pt isolative in bed this AM.  Pt denies SI/HI/I/P or AH/VH or paranoia.  Pt eating and sleeping well.  Pt reports he spoke to his friend he reported he can stay with him.  Pt reports his plan when he leaves the hospital is to get better.  Pt asked how, reports he will take his medication.  Pt asked if he plans to walk to [place], and he denies this.  Pt reports he was going to a concert.  Pt reports he plans to go there in the future although he has not plan to get there or funds.  Pt continues to decline care coordination, reports he does not want assistance.
Pt compliant with medication and tolerating it well.  Chart reviewed and case discussed with treatment team.  No events reported overnight.  Pt mostly isolative in bed.  Pt denies SI/HI/I/P or AH/VH or paranoia.  Pt eating and sleeping well.  SW discusses care coordination to help him with entitlement benefits and patient declines, reports he does not want the government's help and is unwilling to elaborate whey.  Pt reports he can support himself, yet is homeless and steals food.  Pt then becomes irritable and discharge focused.
Pt compliant with medication and tolerating it well.  Chart reviewed and case discussed with treatment team.  No events reported overnight.  Pt irritable and guarded during interview.  Pt denies SI/HI/I/P or AH/VH or paranoia.  Pt eating and sleeping well.  Pt asked about his family, reports they  and he does not want to discuss them.  Pt denies being in a psychiatric hospital previously despite saying on admission that he has.
Patient is followed up for psychosis.   Patient is discussed with nursing team, no interval events.  Patient was [place] along [place] intersection of the [facility] and [facility].  Per nursing report little information is known about patient.   Patient remains compliant with all standing medications, no SE noted. Patient has been in fair behavioral control no prns for aggression, mostly isolated to his room.    Patient is observed in his room. Patient is asked how he is feeling, pt nods head yes. Pt then covers head with  blanket. Nods yes to taking his meds, nods no to SE. Titrate Risperdal 2mg qhs.   Pt is currently nonverbal, but there is low suspicion for catatonia. Pt's mutism is likely volitional.   No acute medical issues. VSS. Continue to monitor and provide therapeutic support.     
Patient is followed up for psychosis.   Patient is discussed with nursing team, no interval events.  Patient remains compliant with all standing medications, no SE noted. Patient has been in fair behavioral control no prns for aggression, mostly isolated to his room.    Patient is observed in his room. Patient reports feeling well.  Offers no complaints. Denies SI/HI, no plan or intent  No AVH reported. Discharged focus states he wants to go to shelter.   No acute medical issues. VSS. Continue to monitor and provide therapeutic support.   
Pt compliant with medication and tolerating it well.  Chart reviewed and case discussed with treatment team.  No events reported overnight.  Pt isolative in bed this AM, but comes out for interview.  Pt denies SI/HI/I/P or AH/VH or paranoia.  Pt eating and sleeping well.  Pt asks about discharge.  Pt reports he was previously in a shelter, but has not been there in months.  Pt reports he was staying with a friend prior to admission, but he does not feel comfortable giving the treatment team his friend's number due to him living with his parents.  Pt reports he will go back to the shelter.  Pt continues to decline referral for care coordinator, reports he does not want the government's assistance  [place].  Pt asks how he gets food, reports now he gets it from food pantries or his friend provides him food.
Pt compliant with medication and tolerating it well.  Chart reviewed and case discussed with treatment team.  No events reported overnight.  Pt visible on the unit, socializing with peers.  Pt denies SI/HI/I/P or AH/VH or paranoia.  Pt eating and sleeping well.
Pt compliant with medication and tolerating it well.  Chart reviewed and case discussed with treatment team.  No events reported overnight.  Pt denies SI/HI/I/P or AH/VH or paranoia.  Pt eating and sleeping well.  Pt seen socializing with peers.  Pt reports when he is discharged, he is going to go back to everyday life, which includes hanging out with his friends and going to the park.  Pt report he can stay with his friend who will provide him food and shelter.
Pt compliant with medication and tolerating it well.  Chart reviewed and case discussed with treatment team.  No events reported overnight.  Pt denies SI/HI/I/P or AH/VH or paranoia.  Pt eating and sleeping well.  Pt reports feeling ready for discharge soon.  Pt denies thoughts to walk to [place].
Patient is followed up for psychosis.   Patient is discussed with nursing team, no interval events.  Patient was admitted after being found wandering [place] the highway along the intersection of the [facility] and [facility].  Per nursing report little information is known about patient.   Patient remains compliant with all standing medications, no SE noted. Patient has been in fair behavioral control no prns for aggression.    Patient is observed in his room.  Patient is  essentially uninterviewable. when notewriter attempted to obtain information, pt did not answer interview questions. Patient is asked how he is feeling, pt nods head yes, patient is asked if he can respond verbally.  Pt then covers head with  blanket.   Pt is currently nonverbal, but there is low suspicion for catatonia. Pt's mutism is likely volitional.   No acute medical issues. VSS. Continue to monitor and provide therapeutic support.

## 2023-07-12 NOTE — BH INPATIENT PSYCHIATRY PROGRESS NOTE - NSBHMSEAFFCONG_PSY_A_CORE
Congruent
Congruent
Non-congruent
Congruent
Non-congruent
Non-congruent
Congruent
Congruent

## 2023-07-12 NOTE — BH DISCHARGE NOTE NURSING/SOCIAL WORK/PSYCH REHAB - NSDCPRGOAL_PSY_ALL_CORE
Writer met with patient to safety plan for discharge and discuss the patient’s progress throughout the inpatient stay. Patient was receptive to safety planning and readily identified coping skills, triggers, social support, and reasons for living.   Upon admission, patient was reportedly BIB EMS after a bystander observed him wondering on the side of the highway and was uncooperative/disorganized on approach. On the unit, patient was mostly guarded and isolative to self and his room. Patient was not very visible on the unit throughout his stay and did not engage in most group counseling sessions despite encouragement from psychiatric rehabilitation staff. Over the course of this hospitalization, patient became increasingly more cooperative with interviews and made improvements towards his treatment goal of making at least 3 goal and reality oriented statements during therapy.   At discharge, patient presents as calm and enthusiastic regarding his discharge. Patient demonstrates calm and cooperative behavioral control, fair ADLs, and medication/treatment compliance. Patient denied SI/HI/AVH.

## 2023-07-12 NOTE — BH INPATIENT PSYCHIATRY PROGRESS NOTE - NSTXDISORGDATETRGT_PSY_ALL_CORE
14-Jul-2023
12-Jul-2023
07-Jul-2023
14-Jul-2023

## 2023-07-12 NOTE — BH INPATIENT PSYCHIATRY PROGRESS NOTE - NSTXDCHOUSDATEEST_PSY_ALL_CORE
30-Jun-2023
06-Jul-2023
30-Jun-2023
06-Jul-2023

## 2023-07-12 NOTE — BH INPATIENT PSYCHIATRY PROGRESS NOTE - NSBHMSEAFFQUAL_PSY_A_CORE
Euthymic/Irritable
Irritable
Euthymic
Irritable
Euthymic
Irritable
Euthymic

## 2023-07-12 NOTE — BH INPATIENT PSYCHIATRY PROGRESS NOTE - NSTXDISORGPROGRES_PSY_ALL_CORE
Improving
Improving
Met - goal discontinued

## 2023-07-12 NOTE — BH INPATIENT PSYCHIATRY PROGRESS NOTE - NSTXDCHOUSGOAL_PSY_ALL_CORE
Will meet with care coordinator and accept services

## 2023-07-12 NOTE — BH DISCHARGE NOTE NURSING/SOCIAL WORK/PSYCH REHAB - DISCHARGE INSTRUCTIONS AFTERCARE APPOINTMENTS
In order to check the location, date, or time of your aftercare appointment, please refer to your Discharge Instructions Document given to you upon leaving the hospital.  If you have lost the instructions please call [phone]

## 2023-07-12 NOTE — BH INPATIENT PSYCHIATRY PROGRESS NOTE - NSBHCHARTREVIEWVS_PSY_A_CORE FT
Vital Signs Last 24 Hrs  T(C): 36.6 (07-12-23 @ 08:08), Max: 36.6 (07-12-23 @ 08:08)  T(F): 97.9 (07-12-23 @ 08:08), Max: 97.9 (07-12-23 @ 08:08)  HR: --  BP: --  BP(mean): --  RR: --  SpO2: --    Orthostatic VS  07-12-23 @ 08:08  Lying BP: --/-- HR: --  Sitting BP: 124/85 HR: 60  Standing BP: --/-- HR: --  Site: --  Mode: --

## 2023-07-12 NOTE — BH DISCHARGE NOTE NURSING/SOCIAL WORK/PSYCH REHAB - NSCDUDCCRISIS_PSY_A_CORE
[facility]  1 [phone]) [facility]-WELL ([phone])  Text "WELL" to [phone]  Website: www.[employer]/.[employer] 1 (140) 621-OSDQ (6045) Website: www.safehorizon.org/.National Suicide Prevention Lifeline 6 (423) 681-7453/.  [employer]  1 (114) L[employer] (551-0134)/.  [facility]  [phone] [place] [phone]   Hours:  Monday through Friday from 9 AM to 3 PM/[facility] Suicide and Crisis [facility]

## 2023-07-12 NOTE — BH INPATIENT PSYCHIATRY PROGRESS NOTE - NSDCCRITERIA_PSY_ALL_CORE
remission of sx

## 2023-07-12 NOTE — BH INPATIENT PSYCHIATRY PROGRESS NOTE - NSTXCONFDATEEST_PSY_ALL_CORE
30-Jun-2023
05-Jul-2023
30-Jun-2023
05-Jul-2023

## 2023-07-12 NOTE — BH INPATIENT PSYCHIATRY PROGRESS NOTE - NSTXDISORGDATEEST_PSY_ALL_CORE
30-Jun-2023
05-Jul-2023
30-Jun-2023

## 2023-07-12 NOTE — BH INPATIENT PSYCHIATRY PROGRESS NOTE - NSTXDCHOUSDATETRGT_PSY_ALL_CORE
07-Jul-2023
07-Jul-2023
14-Jul-2023
07-Jul-2023
14-Jul-2023

## 2023-07-12 NOTE — BH INPATIENT PSYCHIATRY PROGRESS NOTE - NSTXPROBCONF_PSY_ALL_CORE
CONFUSION, ACUTE/CHRONIC

## 2025-02-08 NOTE — BH INPATIENT PSYCHIATRY PROGRESS NOTE - NSBHMSESPEECH_PSY_A_CORE
normal performance when he attempted to engage, volume was soft and there was no latency noted./Abnormal as indicated, otherwise normal...